# Patient Record
Sex: MALE | URBAN - METROPOLITAN AREA
[De-identification: names, ages, dates, MRNs, and addresses within clinical notes are randomized per-mention and may not be internally consistent; named-entity substitution may affect disease eponyms.]

---

## 2018-08-09 ENCOUNTER — HOSPITAL ENCOUNTER (INPATIENT)
Facility: HOSPITAL | Age: 63
LOS: 6 days | Discharge: ANOTHER HOSPITAL | DRG: 542 | End: 2018-08-15
Attending: NEUROLOGICAL SURGERY | Admitting: NEUROLOGICAL SURGERY
Payer: MEDICAID

## 2018-08-09 DIAGNOSIS — C79.51 PROSTATE CANCER METASTATIC TO BONE: ICD-10-CM

## 2018-08-09 DIAGNOSIS — Z95.5 HISTORY OF CORONARY ARTERY STENT PLACEMENT: ICD-10-CM

## 2018-08-09 DIAGNOSIS — I71.21 THORACIC ASCENDING AORTIC ANEURYSM: ICD-10-CM

## 2018-08-09 DIAGNOSIS — I25.10 CORONARY ARTERY DISEASE INVOLVING NATIVE CORONARY ARTERY OF NATIVE HEART WITHOUT ANGINA PECTORIS: ICD-10-CM

## 2018-08-09 DIAGNOSIS — Z01.818 PRE-OPERATIVE CLEARANCE: ICD-10-CM

## 2018-08-09 DIAGNOSIS — M48.9 MASS OF THORACIC VERTEBRA: ICD-10-CM

## 2018-08-09 DIAGNOSIS — C61 PROSTATE CANCER METASTATIC TO BONE: ICD-10-CM

## 2018-08-09 DIAGNOSIS — R29.898 BILATERAL LEG WEAKNESS: ICD-10-CM

## 2018-08-09 DIAGNOSIS — Z01.818 PRE-OP EVALUATION: ICD-10-CM

## 2018-08-09 DIAGNOSIS — I26.99 OTHER ACUTE PULMONARY EMBOLISM WITHOUT ACUTE COR PULMONALE: ICD-10-CM

## 2018-08-09 DIAGNOSIS — R93.89 ABNORMAL CT SCAN, CHEST: ICD-10-CM

## 2018-08-09 DIAGNOSIS — M47.14 THORACIC MYELOPATHY: ICD-10-CM

## 2018-08-09 DIAGNOSIS — N13.30 HYDROURETERONEPHROSIS: ICD-10-CM

## 2018-08-09 PROCEDURE — 85730 THROMBOPLASTIN TIME PARTIAL: CPT

## 2018-08-09 PROCEDURE — 80053 COMPREHEN METABOLIC PANEL: CPT

## 2018-08-09 PROCEDURE — 36415 COLL VENOUS BLD VENIPUNCTURE: CPT

## 2018-08-09 PROCEDURE — 86901 BLOOD TYPING SEROLOGIC RH(D): CPT

## 2018-08-09 PROCEDURE — 20600001 HC STEP DOWN PRIVATE ROOM

## 2018-08-09 PROCEDURE — 85025 COMPLETE CBC W/AUTO DIFF WBC: CPT

## 2018-08-09 PROCEDURE — 85610 PROTHROMBIN TIME: CPT

## 2018-08-09 RX ORDER — IBUPROFEN 200 MG
16 TABLET ORAL
Status: DISCONTINUED | OUTPATIENT
Start: 2018-08-09 | End: 2018-08-13

## 2018-08-09 RX ORDER — AMOXICILLIN 250 MG
1 CAPSULE ORAL DAILY
Status: DISCONTINUED | OUTPATIENT
Start: 2018-08-10 | End: 2018-08-14

## 2018-08-09 RX ORDER — AMOXICILLIN 250 MG
1 CAPSULE ORAL DAILY PRN
Status: DISCONTINUED | OUTPATIENT
Start: 2018-08-09 | End: 2018-08-09

## 2018-08-09 RX ORDER — SODIUM CHLORIDE 9 MG/ML
INJECTION, SOLUTION INTRAVENOUS CONTINUOUS
Status: DISCONTINUED | OUTPATIENT
Start: 2018-08-10 | End: 2018-08-15 | Stop reason: HOSPADM

## 2018-08-09 RX ORDER — IBUPROFEN 200 MG
24 TABLET ORAL
Status: DISCONTINUED | OUTPATIENT
Start: 2018-08-09 | End: 2018-08-13

## 2018-08-09 RX ORDER — CLOPIDOGREL BISULFATE 75 MG/1
75 TABLET ORAL DAILY
Status: ON HOLD | COMMUNITY
End: 2018-08-15 | Stop reason: HOSPADM

## 2018-08-09 RX ORDER — OXYCODONE AND ACETAMINOPHEN 10; 325 MG/1; MG/1
1 TABLET ORAL ONCE
Status: COMPLETED | OUTPATIENT
Start: 2018-08-09 | End: 2018-08-10

## 2018-08-09 RX ORDER — PANTOPRAZOLE SODIUM 40 MG/1
40 TABLET, DELAYED RELEASE ORAL DAILY
Status: DISCONTINUED | OUTPATIENT
Start: 2018-08-10 | End: 2018-08-15 | Stop reason: HOSPADM

## 2018-08-09 RX ORDER — INSULIN ASPART 100 [IU]/ML
1-10 INJECTION, SOLUTION INTRAVENOUS; SUBCUTANEOUS
Status: DISCONTINUED | OUTPATIENT
Start: 2018-08-09 | End: 2018-08-13

## 2018-08-09 RX ORDER — GLUCAGON 1 MG
1 KIT INJECTION
Status: DISCONTINUED | OUTPATIENT
Start: 2018-08-09 | End: 2018-08-13

## 2018-08-09 RX ORDER — ASPIRIN 325 MG
325 TABLET ORAL DAILY
Status: ON HOLD | COMMUNITY
End: 2018-08-15 | Stop reason: HOSPADM

## 2018-08-09 RX ORDER — METHADONE HYDROCHLORIDE 10 MG/1
10 TABLET ORAL 2 TIMES DAILY
Status: ON HOLD | COMMUNITY
End: 2018-08-15 | Stop reason: HOSPADM

## 2018-08-09 RX ORDER — ACETAMINOPHEN 325 MG/1
650 TABLET ORAL EVERY 6 HOURS PRN
Status: DISCONTINUED | OUTPATIENT
Start: 2018-08-09 | End: 2018-08-15 | Stop reason: HOSPADM

## 2018-08-09 RX ORDER — HYDRALAZINE HYDROCHLORIDE 20 MG/ML
10 INJECTION INTRAMUSCULAR; INTRAVENOUS EVERY 6 HOURS PRN
Status: DISCONTINUED | OUTPATIENT
Start: 2018-08-09 | End: 2018-08-15 | Stop reason: HOSPADM

## 2018-08-10 PROBLEM — R29.898 BILATERAL LEG WEAKNESS: Status: ACTIVE | Noted: 2018-08-10

## 2018-08-10 PROBLEM — I25.10 CAD (CORONARY ARTERY DISEASE): Status: ACTIVE | Noted: 2018-08-10

## 2018-08-10 PROBLEM — M47.14 THORACIC MYELOPATHY: Status: ACTIVE | Noted: 2018-08-10

## 2018-08-10 PROBLEM — Z01.818 PRE-OP EVALUATION: Status: ACTIVE | Noted: 2018-08-10

## 2018-08-10 PROBLEM — C79.51 PROSTATE CANCER METASTATIC TO BONE: Status: ACTIVE | Noted: 2018-08-10

## 2018-08-10 PROBLEM — C61 PROSTATE CANCER METASTATIC TO BONE: Status: ACTIVE | Noted: 2018-08-10

## 2018-08-10 PROBLEM — Z01.818 PRE-OPERATIVE CLEARANCE: Status: ACTIVE | Noted: 2018-08-10

## 2018-08-10 LAB
ABO + RH BLD: NORMAL
ALBUMIN SERPL BCP-MCNC: 3.2 G/DL
ALP SERPL-CCNC: 104 U/L
ALT SERPL W/O P-5'-P-CCNC: 7 U/L
ANION GAP SERPL CALC-SCNC: 14 MMOL/L
ANION GAP SERPL CALC-SCNC: 9 MMOL/L
APTT BLDCRRT: 28 SEC
AST SERPL-CCNC: 12 U/L
BASOPHILS # BLD AUTO: 0.02 K/UL
BASOPHILS # BLD AUTO: 0.03 K/UL
BASOPHILS NFR BLD: 0.2 %
BASOPHILS NFR BLD: 0.3 %
BILIRUB SERPL-MCNC: 0.5 MG/DL
BLD GP AB SCN CELLS X3 SERPL QL: NORMAL
BUN SERPL-MCNC: 28 MG/DL
BUN SERPL-MCNC: 29 MG/DL
CALCIUM SERPL-MCNC: 9.5 MG/DL
CALCIUM SERPL-MCNC: 9.8 MG/DL
CHLORIDE SERPL-SCNC: 100 MMOL/L
CHLORIDE SERPL-SCNC: 102 MMOL/L
CO2 SERPL-SCNC: 23 MMOL/L
CO2 SERPL-SCNC: 26 MMOL/L
COMPLEXED PSA SERPL-MCNC: 938.3 NG/ML
CREAT SERPL-MCNC: 0.9 MG/DL
CREAT SERPL-MCNC: 1 MG/DL
DIFFERENTIAL METHOD: ABNORMAL
DIFFERENTIAL METHOD: ABNORMAL
EOSINOPHIL # BLD AUTO: 0 K/UL
EOSINOPHIL # BLD AUTO: 0.1 K/UL
EOSINOPHIL NFR BLD: 0.1 %
EOSINOPHIL NFR BLD: 0.5 %
ERYTHROCYTE [DISTWIDTH] IN BLOOD BY AUTOMATED COUNT: 13.2 %
ERYTHROCYTE [DISTWIDTH] IN BLOOD BY AUTOMATED COUNT: 13.3 %
EST. GFR  (AFRICAN AMERICAN): >60 ML/MIN/1.73 M^2
EST. GFR  (AFRICAN AMERICAN): >60 ML/MIN/1.73 M^2
EST. GFR  (NON AFRICAN AMERICAN): >60 ML/MIN/1.73 M^2
EST. GFR  (NON AFRICAN AMERICAN): >60 ML/MIN/1.73 M^2
GLUCOSE SERPL-MCNC: 111 MG/DL
GLUCOSE SERPL-MCNC: 84 MG/DL
HCT VFR BLD AUTO: 36.6 %
HCT VFR BLD AUTO: 37 %
HGB BLD-MCNC: 11.9 G/DL
HGB BLD-MCNC: 12.4 G/DL
IMM GRANULOCYTES # BLD AUTO: 0.04 K/UL
IMM GRANULOCYTES # BLD AUTO: 0.04 K/UL
IMM GRANULOCYTES NFR BLD AUTO: 0.4 %
IMM GRANULOCYTES NFR BLD AUTO: 0.4 %
INR PPP: 1
LYMPHOCYTES # BLD AUTO: 1.4 K/UL
LYMPHOCYTES # BLD AUTO: 1.8 K/UL
LYMPHOCYTES NFR BLD: 13.2 %
LYMPHOCYTES NFR BLD: 18.4 %
MCH RBC QN AUTO: 29.9 PG
MCH RBC QN AUTO: 30.6 PG
MCHC RBC AUTO-ENTMCNC: 32.2 G/DL
MCHC RBC AUTO-ENTMCNC: 33.9 G/DL
MCV RBC AUTO: 90 FL
MCV RBC AUTO: 93 FL
MONOCYTES # BLD AUTO: 0.8 K/UL
MONOCYTES # BLD AUTO: 0.9 K/UL
MONOCYTES NFR BLD: 7.6 %
MONOCYTES NFR BLD: 9.4 %
NEUTROPHILS # BLD AUTO: 7.1 K/UL
NEUTROPHILS # BLD AUTO: 8.4 K/UL
NEUTROPHILS NFR BLD: 71 %
NEUTROPHILS NFR BLD: 78.5 %
NRBC BLD-RTO: 0 /100 WBC
NRBC BLD-RTO: 0 /100 WBC
PLATELET # BLD AUTO: 283 K/UL
PLATELET # BLD AUTO: 288 K/UL
PMV BLD AUTO: 10.3 FL
PMV BLD AUTO: 10.7 FL
POTASSIUM SERPL-SCNC: 4.1 MMOL/L
POTASSIUM SERPL-SCNC: 4.3 MMOL/L
PROT SERPL-MCNC: 7.3 G/DL
PROTHROMBIN TIME: 10.5 SEC
RBC # BLD AUTO: 3.98 M/UL
RBC # BLD AUTO: 4.05 M/UL
SODIUM SERPL-SCNC: 135 MMOL/L
SODIUM SERPL-SCNC: 139 MMOL/L
WBC # BLD AUTO: 10.02 K/UL
WBC # BLD AUTO: 10.69 K/UL

## 2018-08-10 PROCEDURE — 36415 COLL VENOUS BLD VENIPUNCTURE: CPT

## 2018-08-10 PROCEDURE — 85025 COMPLETE CBC W/AUTO DIFF WBC: CPT

## 2018-08-10 PROCEDURE — 99222 1ST HOSP IP/OBS MODERATE 55: CPT | Mod: ,,, | Performed by: NEUROLOGICAL SURGERY

## 2018-08-10 PROCEDURE — 93005 ELECTROCARDIOGRAM TRACING: CPT

## 2018-08-10 PROCEDURE — 25500020 PHARM REV CODE 255: Performed by: NEUROLOGICAL SURGERY

## 2018-08-10 PROCEDURE — 84153 ASSAY OF PSA TOTAL: CPT

## 2018-08-10 PROCEDURE — 20600001 HC STEP DOWN PRIVATE ROOM

## 2018-08-10 PROCEDURE — 99222 1ST HOSP IP/OBS MODERATE 55: CPT | Mod: ,,, | Performed by: INTERNAL MEDICINE

## 2018-08-10 PROCEDURE — 80048 BASIC METABOLIC PNL TOTAL CA: CPT

## 2018-08-10 PROCEDURE — 93010 ELECTROCARDIOGRAM REPORT: CPT | Mod: ,,, | Performed by: INTERNAL MEDICINE

## 2018-08-10 PROCEDURE — 25000003 PHARM REV CODE 250: Performed by: STUDENT IN AN ORGANIZED HEALTH CARE EDUCATION/TRAINING PROGRAM

## 2018-08-10 PROCEDURE — 63600175 PHARM REV CODE 636 W HCPCS: Performed by: STUDENT IN AN ORGANIZED HEALTH CARE EDUCATION/TRAINING PROGRAM

## 2018-08-10 RX ORDER — ONDANSETRON 2 MG/ML
8 INJECTION INTRAMUSCULAR; INTRAVENOUS EVERY 4 HOURS PRN
Status: DISCONTINUED | OUTPATIENT
Start: 2018-08-10 | End: 2018-08-15 | Stop reason: HOSPADM

## 2018-08-10 RX ORDER — HYDROCODONE BITARTRATE AND ACETAMINOPHEN 10; 325 MG/1; MG/1
1 TABLET ORAL EVERY 8 HOURS PRN
Status: ON HOLD | COMMUNITY
Start: 2018-07-30 | End: 2018-08-15 | Stop reason: HOSPADM

## 2018-08-10 RX ORDER — HYDROMORPHONE HYDROCHLORIDE 1 MG/ML
0.5 INJECTION, SOLUTION INTRAMUSCULAR; INTRAVENOUS; SUBCUTANEOUS
Status: DISCONTINUED | OUTPATIENT
Start: 2018-08-10 | End: 2018-08-13

## 2018-08-10 RX ORDER — GABAPENTIN 300 MG/1
300 CAPSULE ORAL 3 TIMES DAILY
Status: DISCONTINUED | OUTPATIENT
Start: 2018-08-10 | End: 2018-08-15 | Stop reason: HOSPADM

## 2018-08-10 RX ORDER — DEXAMETHASONE SODIUM PHOSPHATE 4 MG/ML
6 INJECTION, SOLUTION INTRA-ARTICULAR; INTRALESIONAL; INTRAMUSCULAR; INTRAVENOUS; SOFT TISSUE EVERY 6 HOURS
Status: DISCONTINUED | OUTPATIENT
Start: 2018-08-10 | End: 2018-08-14

## 2018-08-10 RX ORDER — DEXAMETHASONE SODIUM PHOSPHATE 4 MG/ML
4 INJECTION, SOLUTION INTRA-ARTICULAR; INTRALESIONAL; INTRAMUSCULAR; INTRAVENOUS; SOFT TISSUE EVERY 6 HOURS
Status: DISCONTINUED | OUTPATIENT
Start: 2018-08-10 | End: 2018-08-10

## 2018-08-10 RX ORDER — TAMSULOSIN HYDROCHLORIDE 0.4 MG/1
0.4 CAPSULE ORAL NIGHTLY
Status: DISCONTINUED | OUTPATIENT
Start: 2018-08-10 | End: 2018-08-15 | Stop reason: HOSPADM

## 2018-08-10 RX ORDER — OXYCODONE AND ACETAMINOPHEN 10; 325 MG/1; MG/1
1 TABLET ORAL EVERY 4 HOURS PRN
Status: DISCONTINUED | OUTPATIENT
Start: 2018-08-10 | End: 2018-08-15 | Stop reason: HOSPADM

## 2018-08-10 RX ADMIN — OXYCODONE HYDROCHLORIDE AND ACETAMINOPHEN 1 TABLET: 10; 325 TABLET ORAL at 12:08

## 2018-08-10 RX ADMIN — SODIUM CHLORIDE: 0.9 INJECTION, SOLUTION INTRAVENOUS at 06:08

## 2018-08-10 RX ADMIN — HYDROMORPHONE HYDROCHLORIDE 0.5 MG: 1 INJECTION, SOLUTION INTRAMUSCULAR; INTRAVENOUS; SUBCUTANEOUS at 04:08

## 2018-08-10 RX ADMIN — OXYCODONE HYDROCHLORIDE AND ACETAMINOPHEN 1 TABLET: 10; 325 TABLET ORAL at 05:08

## 2018-08-10 RX ADMIN — GABAPENTIN 300 MG: 300 CAPSULE ORAL at 08:08

## 2018-08-10 RX ADMIN — OXYCODONE HYDROCHLORIDE AND ACETAMINOPHEN 1 TABLET: 10; 325 TABLET ORAL at 01:08

## 2018-08-10 RX ADMIN — SODIUM CHLORIDE: 0.9 INJECTION, SOLUTION INTRAVENOUS at 12:08

## 2018-08-10 RX ADMIN — OXYCODONE HYDROCHLORIDE AND ACETAMINOPHEN 1 TABLET: 10; 325 TABLET ORAL at 09:08

## 2018-08-10 RX ADMIN — GABAPENTIN 300 MG: 300 CAPSULE ORAL at 04:08

## 2018-08-10 RX ADMIN — DEXAMETHASONE SODIUM PHOSPHATE 6 MG: 4 INJECTION, SOLUTION INTRAMUSCULAR; INTRAVENOUS at 11:08

## 2018-08-10 RX ADMIN — TAMSULOSIN HYDROCHLORIDE 0.4 MG: 0.4 CAPSULE ORAL at 08:08

## 2018-08-10 RX ADMIN — OXYCODONE HYDROCHLORIDE AND ACETAMINOPHEN 1 TABLET: 10; 325 TABLET ORAL at 10:08

## 2018-08-10 RX ADMIN — HYDROMORPHONE HYDROCHLORIDE 0.5 MG: 1 INJECTION, SOLUTION INTRAMUSCULAR; INTRAVENOUS; SUBCUTANEOUS at 11:08

## 2018-08-10 RX ADMIN — DEXAMETHASONE SODIUM PHOSPHATE 6 MG: 4 INJECTION, SOLUTION INTRAMUSCULAR; INTRAVENOUS at 05:08

## 2018-08-10 RX ADMIN — SODIUM CHLORIDE: 0.9 INJECTION, SOLUTION INTRAVENOUS at 09:08

## 2018-08-10 RX ADMIN — PANTOPRAZOLE SODIUM 40 MG: 40 TABLET, DELAYED RELEASE ORAL at 09:08

## 2018-08-10 RX ADMIN — HYDROMORPHONE HYDROCHLORIDE 0.5 MG: 1 INJECTION, SOLUTION INTRAMUSCULAR; INTRAVENOUS; SUBCUTANEOUS at 07:08

## 2018-08-10 RX ADMIN — DEXAMETHASONE SODIUM PHOSPHATE 6 MG: 4 INJECTION, SOLUTION INTRAMUSCULAR; INTRAVENOUS at 06:08

## 2018-08-10 RX ADMIN — ONDANSETRON 8 MG: 2 INJECTION INTRAMUSCULAR; INTRAVENOUS at 04:08

## 2018-08-10 RX ADMIN — IOHEXOL 100 ML: 350 INJECTION, SOLUTION INTRAVENOUS at 09:08

## 2018-08-10 RX ADMIN — GABAPENTIN 300 MG: 300 CAPSULE ORAL at 09:08

## 2018-08-10 RX ADMIN — HYDROMORPHONE HYDROCHLORIDE 0.5 MG: 1 INJECTION, SOLUTION INTRAMUSCULAR; INTRAVENOUS; SUBCUTANEOUS at 06:08

## 2018-08-10 RX ADMIN — SENNOSIDES AND DOCUSATE SODIUM 1 TABLET: 8.6; 5 TABLET ORAL at 09:08

## 2018-08-10 NOTE — CONSULTS
[facility] Medicine  Consult Note    Patient Name: Edward Barber  MRN: [id number]  Admission Date: 8/9/2018  Hospital Length of Stay: 1 days  Attending Physician: Zach Patel MD   Primary Care Provider: Primary Doctor [facility] Medicine Team: Networked reference to record PCT  Jada Queen DO      Patient information was obtained from patient and past medical records.     Consults  Subjective:     Principal Problem: <principal problem not specified>    Chief Complaint: No chief complaint on file.       HPI: Mr. 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 Complains of 8/10 mid back pain that radiates to his chest. States symptoms have improved with administration of steroids. He denies urinary incontinence or difficulty urinating. Denies bowel incontinence, complains of constipation and decreased appetite.Scheduled to have surgery 8/13.    [facility] medicine was consulted for pre op clearance.    Past Medical History:   Diagnosis Date    Arthritis     CAD (coronary artery disease) 8/10/2018    Cancer        Past Surgical History:   Procedure Laterality Date    CARDIAC SURGERY      JOINT REPLACEMENT         Review of patient's allergies indicates:  No Known Allergies    No current facility-administered medications on file prior to encounter.      No current outpatient prescriptions on file prior to encounter.     Family History     None        Social History Main Topics    Smoking status: Former Smoker     Quit date: 8/9/1998    Smokeless tobacco: Never Used    Alcohol use No    Drug use: No    Sexual activity: No     Review of Systems   Constitutional: Positive for appetite change. Negative for fatigue and fever.   HENT: Negative for  trouble swallowing.    Eyes: Negative for photophobia and visual disturbance.   Respiratory: Negative for chest tightness and shortness of breath.    Cardiovascular: Negative for chest pain and palpitations.   Gastrointestinal: Positive for constipation. Negative for abdominal pain, diarrhea, nausea and vomiting.   Genitourinary: Negative for difficulty urinating and dysuria.   Musculoskeletal: Positive for back pain.   Neurological: Positive for weakness. Negative for dizziness, speech difficulty and headaches.   Psychiatric/Behavioral: Negative for behavioral problems and confusion.     Objective:     Vital Signs (Most Recent):  Temp: 97.6 °F (36.4 °C) (08/10/18 1542)  Pulse: 61 (08/10/18 1542)  Resp: 18 (08/10/18 1542)  BP: (!) 159/80 (08/10/18 1542)  SpO2: 98 % (08/10/18 1542) Vital Signs (24h Range):  Temp:  [97.6 °F (36.4 °C)-99.1 °F (37.3 °C)] 97.6 °F (36.4 °C)  Pulse:  [61-72] 61  Resp:  [16-20] 18  SpO2:  [93 %-99 %] 98 %  BP: (137-159)/(73-88) 159/80     Weight: 87.8 kg (193 lb 9.6 oz)  Body mass index is 27 kg/m².    Physical Exam   Constitutional: He is oriented to person, place, and time.   HENT:   Head: Normocephalic and atraumatic.   Eyes: EOM are normal.   Cardiovascular: Normal rate, regular rhythm, normal heart sounds and intact distal pulses.    No murmur heard.  Pulmonary/Chest: Effort normal and breath sounds normal. No respiratory distress. He has no wheezes. He exhibits no tenderness.   Abdominal: Soft. Bowel sounds are normal. He exhibits no distension and no mass. There is no guarding.   Musculoskeletal: He exhibits no edema or tenderness.        Right hip: He exhibits decreased strength. He exhibits normal range of motion.        Left hip: He exhibits decreased strength. He exhibits normal range of motion.   Neurological: He is alert and oriented to person, place, and time. A sensory deficit is present. No cranial nerve deficit. He exhibits abnormal muscle tone (3/5 muscle strength in b/l  extremities. 5/5 in b/l ankles. Cannot actively flex b/l hips and knees).   Skin: Skin is warm and dry.   Psychiatric: His behavior is normal. Thought content normal.       Significant Labs:   BMP:   Recent Labs  Lab 08/10/18  0509   GLU 84   *   K 4.1      CO2 26   BUN 29*   CREATININE 0.9   CALCIUM 9.5     CBC:   Recent Labs  Lab 08/09/18  2332 08/10/18  0509   WBC 10.69 10.02   HGB 12.4* 11.9*   HCT 36.6* 37.0*    283       Significant Imaging: I have reviewed all pertinent imaging results/findings within the past 24 hours.    Assessment/Plan:     Pre-operative clearance      61yo male with hx of CAD s/p stent and Metastatic Prostate cancer, admitted for spinal cord compression.  Intermediate risk surgery    No hx of CVA, CHF, Diabetes or Insulin use.    RCRI-1, carries a 0.9% risk of adverse cardiac event.   EKG: Reviewed, sinus bradycardia, no st changes.  No ECHO done; patient has no clinical signs of heart failure and has no documented history of CHF.   Surgical Risk Assessment   Active cardiac issues:  Active decompensated heart failure? No   Unstable angina?  No   Significant uncontrolled arrhythmias? No   Severe valvular heart disease-Aortic or Mitral Stenosis? No   Recent MI or coronary revascularization < 30 days? No      Cardiac Risk Factors  History of CAD/ischemic heart disease? YES   History of cerebrovascular disease? No   History of compensated heart failure? No   Type 2 diabetes requiring insulin? No   Serum Creatinine > 2? No   Total cardiac risk factors 1      Assessment/Plan:   Cardiovascular Risk Assessment:  Non-emergent, intermediate risk surgery.  Active cardiac problems ( CAD S/P STENT ON DUAL ANTIPLATELET).  Functional Status:  can feed himself (1 METS)  RCRI :1 - 0.9% risk of adverse cardiac events.     Other Issues:       Anticoagulation: ASA 325mg, Plavix 75mg      Coronary Stenting: Yes     Recommendation:  1. Can Proceed to Surgery   2. Withhold ASA and Plavix for  surgery, resume post-op when appropriate  3. Start the patient on DVT prophylaxis as soon as possible after surgery.                 CAD (coronary artery disease)    CAD s/p stent placement 6yrs ago  - On ASA and Plavix at home  -Hold for surgery  - Last saw cardiologist 4 months ago for evaluation.(at [facility], doesn't recall name).          Mass of thoracic vertebra    Likely 2/2 to metastatic prostate cancer  - scheduled for decompressive surgery 8/13  - Continue with current medication regimen   dexamethasone 6q6.   gabapentin 300 tid.  - Neurosurgery following, appreciate recs.          VTE Risk Mitigation         Ordered     Place sequential compression device  Until discontinued      08/09/18 2255     IP VTE LOW RISK PATIENT  Once      08/09/18 2255              Thank you for your consult. I will follow-up with patient. Please contact us if you have any additional questions.    Jada Queen DO  Department of Hospital Medicine   [facility]

## 2018-08-10 NOTE — NURSING
POC reviewed with patient, verbalized understanding. Patient remained free from falls, harm, and pain was somewhat controlled. NAEON, neuro status stable, VSS. Bed alarm active and explained, bed in lowest position, call bell in reach. Will continue to monitor.

## 2018-08-10 NOTE — NURSING
Patient arrived to 741, no distress noted, VSS, neuro status stable. NSx notified of patient arrival. Son at bedside. Plan of care reviewed with patient, both son and patient verbalized understanding. O2 and suction at bedside, bed alarm active and explained with understanding verbalized, bed in lowest position, call bell in reach. Awaiting orders, will continue to monitor.

## 2018-08-10 NOTE — ASSESSMENT & PLAN NOTE
CAD s/p stent placement 6yrs ago  - On ASA and Plavix at home  -Hold for surgery  -Pending EKG.  - Last saw cardiologist 4 months ago for evaluation.(at [facility], doesn't recall name).

## 2018-08-10 NOTE — HPI
Mr. 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 Complains of 8/10 mid back pain that radiates to his chest. States symptoms have improved with administration of steroids. He denies urinary incontinence or difficulty urinating. Denies bowel incontinence, complains of constipation and decreased appetite.Scheduled to have surgery 8/13.    Hospital medicine was consulted for pre op clearance, possible pulmonary emboli seen on CT.

## 2018-08-10 NOTE — ASSESSMENT & PLAN NOTE
61yo male with hx of CAD s/p stent and Metastatic Prostate cancer, admitted for spinal cord compression.  Intermediate risk surgery    No hx of CVA, CHF, Diabetes or Insulin use.    RCRI-1, carries a 0.9% risk of adverse cardiac event.     No ECHO done; patient has no clinical signs of heart failure and has no documented history of CHF.   Surgical Risk Assessment   Active cardiac issues:  Active decompensated heart failure? No   Unstable angina?  No   Significant uncontrolled arrhythmias? No   Severe valvular heart disease-Aortic or Mitral Stenosis? No   Recent MI or coronary revascularization < 30 days? No      Cardiac Risk Factors  History of CAD/ischemic heart disease? YES   History of cerebrovascular disease? No   History of compensated heart failure? No   Type 2 diabetes requiring insulin? No   Serum Creatinine > 2? No   Total cardiac risk factors 1      Assessment/Plan:   Cardiovascular Risk Assessment:  Non-emergent, intermediate risk surgery.  Active cardiac problems (CAD S/P STENT ON DUAL ANTIPLATELET).  Functional Status: can feed himself (1 METS)  RCRI :1 - 0.9% risk of adverse cardiac events.     Other Issues:       Anticoagulation: ASA 325mg, Plavix 75mg      Coronary Stenting: Yes     Recommendation:  1. Can Proceed to Surgery, pending EKG   2. Withhold ASA and Plavix for surgery, resume post-op when appropriate  3. Start the patient on DVT prophylaxis as soon as possible after surgery.

## 2018-08-10 NOTE — ASSESSMENT & PLAN NOTE
Likely 2/2 to metastatic prostate cancer  - scheduled for decompressive surgery 8/13  - Continue with current medication regimen   dexamethasone 6q6.   gabapentin 300 tid.  - Neurosurgery following, appreciate recs.

## 2018-08-10 NOTE — H&P
History and Physical  Neurosurgery    Admit Date: 8/9/2018  LOS: 1    Code Status: Full Code     CC: <principal problem not specified>    SUBJECTIVE:     History of Present Illness: 61 yo male with pmh of cad s/p stenting on asa 325 qd and clopidogrel 75 qd and known prostate CA undergoing active oral chemotherapy presents as direct transfer with four day history and progressive bilateral proximal leg weakness.     Pt did not take anti-platelet regimen today.    On exam pt has paraparesis most severe in bilateral hip flexion and knee extension (4-/5). Pt also complains of radiculopathic thoracic pain in T4 and T5 dermatomes. There is a decreased but preserved sensation to light touch below T4. One beat of clonus on right. No watkins bilaterally. Normoreflexic bilaterally. Perineal sensation present and no complaints of bowel or bladder symptoms. Rectal exam deferred.    Outside constrasted neuroaxis imaging shows osteoblastic metastasis with three column involvement and epidural extension with canal obliteration at T4 and T5. Epidural extension also extends ventrally to T3. No acute fractures.    Pt also with cervical spondylytic disease with canal stenosis and signal change at C4-C5.    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            OBJECTIVE:   Vital Signs (Most Recent):   Temp: 97.9 °F (36.6 °C) (08/09/18 2339)  Pulse: 66 (08/09/18 2339)  Resp: 18 (08/09/18 2339)  BP: (!) 149/88 (08/09/18 2339)  SpO2: (!) 93 % (08/09/18 2339)    Vital Signs (24h Range):   Temp:  [97.7 °F (36.5 °C)-98.7 °F (37.1 °C)] 97.9 °F (36.6 °C)  Pulse:  [62-77] 66  Resp:  [16-18] 18  SpO2:  [93 %-99 %] 93 %  BP: (132-182)/(75-88) 149/88      I & O (Last 24h):    Intake/Output Summary (Last 24 hours) at 08/10/18 0030  Last data filed at 08/10/18 0000   Gross per 24 hour   Intake              350 ml   Output                0 ml   Net               350 ml       Physical Exam:  General: well developed, well nourished, no distress.   Head: normocephalic, atraumatic  Cervical Spine: No midline tenderness to palpation.  Thoracolumbosacral Spine: No midline tenderness to palpation.  GCS: Motor: 6/Verbal: 5/Eyes: 4 GCS Total: 15  Mental Status: Awake, Alert, Oriented x 4  Language: No aphasia  Speech: No dysarthria  Facial Droop: None   Cranial nerves: CN III-XII grossly intact.  Visual Fields: Intact.   Eyes: Pupils equal and reactive to light. Intact Conjugate horizontal and vertical pursuit. No nystagmus. No gaze deviation.   Pulmonary: No distress.  Sensory: Bilateral neuropathic pain in T4-T5 dermatomes.  Propioception: No deficit in 1st digit of toe bilaterally.  Rectal Tone: Not tested.  Drift: None.  Upper Extremity Ataxia: No Dysmetria Bilaterally  Lower Extremity Ataxia: Not tested.  Dysdiadochokinesia: Not tested.  Reflexes: 2+ patellar bilaterally.  Sargent: Absent  Clonus: One beat present on right  Babinski: Absent  Romberg: Not Tested  Pulses: Brisk and symmetric radial, DP and tibial pulses.  Motor Strength:    Strength  Shoulder Abduction Elbow Extension Elbow Flexion Wrist Extension Wrist Flexion Finger Opposition Finger Add Finger Abd   Upper: R 5/5 5/5 5/5 5/5 5/5 5/5 5/5 5/5    L 5/5 5/5 5/5 5/5 5/5 5/5 5/5 5/5     Hip Flexion Knee Extension Knee  Flexion Ankle Dflexion Ankle Pflexion EHL     Lower: R 4-/5 4/5 4-/5 5/5 5/5 5/5      L 4-/5 4/5 4-/5 5/5 5/5 5/5           Lines/Drains/Airway:          Nutrition/Tube Feeds:   Current Diet Order   Procedures    Diet NPO       Labs:  ABG: No results for input(s): PH, PO2, PCO2, HCO3, POCSATURATED, BE in the last 24 hours.  BMP:  Recent Labs  Lab 08/09/18  2332      K 4.3      CO2 23   BUN 28*   CREATININE 1.0   *     LFT: Lab Results   Component Value Date    AST 12 08/09/2018    ALT 7 (L) 08/09/2018    ALKPHOS 104 08/09/2018    BILITOT 0.5 08/09/2018    ALBUMIN 3.2 (L) 08/09/2018     PROT 7.3 08/09/2018     CBC:   Lab Results   Component Value Date    WBC 10.69 08/09/2018    HGB 12.4 (L) 08/09/2018    HCT 36.6 (L) 08/09/2018    MCV 90 08/09/2018     08/09/2018     Microbiology x 7d:   Microbiology Results (last 7 days)     ** No results found for the last 168 hours. **            ASSESSMENT/PLAN:   61 yo male with known prostate CA now with thoracic metastastic disease and canal compromise with progressive myelopathy.     --No acute neurosurgical intervention required.  --Admit to neurosurgery under floor status.  --Begin dexamethasone 6q6.  --Begin gabapentin 300 tid.  --Begin q4 neurochecks.  --Begin q4 vital checks.  --Please keep pt NPO.  --Please hold home antiplatelet regimen.  --Given symptom onset and degree of canal involvement, pt will likely require surgical decompression of neural elements in addition to XRT. Will further discuss potential management options with pt in morning.  --Discussed above with pt and pt family.  --We will continue to monitor closely, please contact us with any questions or concerns.    Carlos Jackson

## 2018-08-10 NOTE — HPI
63 yo male with pmh of cad s/p stenting on asa 325 qd and clopidogrel 75 qd and known prostate CA undergoing active oral chemotherapy presents as direct transfer with four day history of progressive bilateral proximal leg weakness. Pt did not take anti-platelet regimen today.     On exam pt has paraparesis most severe in bilateral hip flexion and knee extension (4-/5). Pt also complains of radiculopathic thoracic pain in T4 and T5 dermatomes. There is a decreased but preserved sensation to light touch below T4. One beat of clonus on right. No watkins bilaterally. Normoreflexic bilaterally. Perineal sensation present and no complaints of bowel or bladder symptoms. Rectal exam deferred.     Outside constrasted neuroaxis imaging shows osteoblastic metastasis with three column involvement and epidural extension with canal obliteration at T4 and T5. Epidural extension also extends ventrally to T3. No acute fractures. Pt also with cervical spondylytic disease with canal stenosis and signal change at C4-C5. 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

## 2018-08-10 NOTE — PLAN OF CARE
CM met with patient this am to obtain discharge planning assessment.  Patient is scheduled for surgery on Monday.  Planned discharge is home - Plan (A) or home with family - Plan (B).    PCP:  Primary Doctor No     Payor:  No insurance    Pharmacy :    [facility] - [address] [address]  Phone: [phone] Fax: [phone]         08/10/18 1202   Discharge Assessment   Assessment Type Discharge Planning Assessment   Confirmed/corrected address and phone number on facesheet? Yes   Assessment information obtained from? Patient   Expected Length of Stay (days) 7   Communicated expected length of stay with patient/caregiver yes   Prior to hospitilization cognitive status: Alert/Oriented   Prior to hospitalization functional status: Independent   Current cognitive status: Alert/Oriented   Current Functional Status: Independent   Lives With alone   Able to Return to Prior Arrangements yes   Is patient able to care for self after discharge? Yes   Who are your caregiver(s) and their phone number(s)? Olga Barber ex-wife [phone]   Patient's perception of discharge disposition home or selfcare   Readmission Within The Last 30 Days no previous admission in last 30 days   Patient currently being followed by outpatient case management? No   Patient currently receives any other outside agency services? No   Equipment Currently Used at Home none   Do you have any problems affording any of your prescribed medications? No   Is the patient taking medications as prescribed? yes   Does the patient have transportation home? Yes   Transportation Available family or friend will provide   Does the patient receive services at the [facility]? No   Discharge Plan B Home with family   Patient/Family In Agreement With Plan yes

## 2018-08-10 NOTE — SUBJECTIVE & OBJECTIVE
Past Medical History:   Diagnosis Date    Arthritis     CAD (coronary artery disease) 8/10/2018    Cancer        Past Surgical History:   Procedure Laterality Date    CARDIAC SURGERY      JOINT REPLACEMENT         Review of patient's allergies indicates:  No Known Allergies    No current facility-administered medications on file prior to encounter.      No current outpatient prescriptions on file prior to encounter.     Family History     None        Social History Main Topics    Smoking status: Former Smoker     Quit date: 8/9/1998    Smokeless tobacco: Never Used    Alcohol use No    Drug use: No    Sexual activity: No     Review of Systems   Constitutional: Positive for appetite change. Negative for fatigue and fever.   HENT: Negative for trouble swallowing.    Eyes: Negative for photophobia and visual disturbance.   Respiratory: Negative for chest tightness and shortness of breath.    Cardiovascular: Negative for chest pain and palpitations.   Gastrointestinal: Positive for constipation. Negative for abdominal pain, diarrhea, nausea and vomiting.   Genitourinary: Negative for difficulty urinating and dysuria.   Musculoskeletal: Positive for back pain.   Neurological: Positive for weakness. Negative for dizziness, speech difficulty and headaches.   Psychiatric/Behavioral: Negative for behavioral problems and confusion.     Objective:     Vital Signs (Most Recent):  Temp: 97.6 °F (36.4 °C) (08/10/18 1542)  Pulse: 61 (08/10/18 1542)  Resp: 18 (08/10/18 1542)  BP: (!) 159/80 (08/10/18 1542)  SpO2: 98 % (08/10/18 1542) Vital Signs (24h Range):  Temp:  [97.6 °F (36.4 °C)-99.1 °F (37.3 °C)] 97.6 °F (36.4 °C)  Pulse:  [61-72] 61  Resp:  [16-20] 18  SpO2:  [93 %-99 %] 98 %  BP: (137-159)/(73-88) 159/80     Weight: 87.8 kg (193 lb 9.6 oz)  Body mass index is 27 kg/m².    Physical Exam   Constitutional: He is oriented to person, place, and time.   HENT:   Head: Normocephalic and atraumatic.   Eyes: EOM are  normal.   Cardiovascular: Normal rate, regular rhythm, normal heart sounds and intact distal pulses.    No murmur heard.  Pulmonary/Chest: Effort normal and breath sounds normal. No respiratory distress. He has no wheezes. He exhibits no tenderness.   Abdominal: Soft. Bowel sounds are normal. He exhibits no distension and no mass. There is no guarding.   Musculoskeletal: He exhibits no edema or tenderness.        Right hip: He exhibits decreased strength. He exhibits normal range of motion.        Left hip: He exhibits decreased strength. He exhibits normal range of motion.   Neurological: He is alert and oriented to person, place, and time. A sensory deficit is present. No cranial nerve deficit. He exhibits abnormal muscle tone (3/5 muscle strength in b/l extremities. 5/5 in b/l ankles. Cannot actively flex b/l hips and knees).   Skin: Skin is warm and dry.   Psychiatric: His behavior is normal. Thought content normal.       Significant Labs:   BMP:   Recent Labs  Lab 08/10/18  0509   GLU 84   *   K 4.1      CO2 26   BUN 29*   CREATININE 0.9   CALCIUM 9.5     CBC:   Recent Labs  Lab 08/09/18  2332 08/10/18  0509   WBC 10.69 10.02   HGB 12.4* 11.9*   HCT 36.6* 37.0*    283       Significant Imaging: I have reviewed all pertinent imaging results/findings within the past 24 hours.

## 2018-08-11 PROBLEM — I26.99 PULMONARY EMBOLISM: Status: ACTIVE | Noted: 2018-08-11

## 2018-08-11 PROBLEM — I71.21 THORACIC ASCENDING AORTIC ANEURYSM: Status: ACTIVE | Noted: 2018-08-11

## 2018-08-11 PROBLEM — R93.89 ABNORMAL CT SCAN, CHEST: Status: ACTIVE | Noted: 2018-08-11

## 2018-08-11 PROBLEM — N13.30 HYDRONEPHROSIS: Status: ACTIVE | Noted: 2018-08-11

## 2018-08-11 LAB
ANION GAP SERPL CALC-SCNC: 11 MMOL/L
BASOPHILS # BLD AUTO: 0.02 K/UL
BASOPHILS NFR BLD: 0.2 %
BUN SERPL-MCNC: 24 MG/DL
CALCIUM SERPL-MCNC: 9.5 MG/DL
CHLORIDE SERPL-SCNC: 102 MMOL/L
CO2 SERPL-SCNC: 23 MMOL/L
CREAT SERPL-MCNC: 0.9 MG/DL
DIFFERENTIAL METHOD: ABNORMAL
EOSINOPHIL # BLD AUTO: 0 K/UL
EOSINOPHIL NFR BLD: 0.1 %
ERYTHROCYTE [DISTWIDTH] IN BLOOD BY AUTOMATED COUNT: 13.2 %
EST. GFR  (AFRICAN AMERICAN): >60 ML/MIN/1.73 M^2
EST. GFR  (NON AFRICAN AMERICAN): >60 ML/MIN/1.73 M^2
GLUCOSE SERPL-MCNC: 101 MG/DL
HCT VFR BLD AUTO: 36.8 %
HGB BLD-MCNC: 12.1 G/DL
IMM GRANULOCYTES # BLD AUTO: 0.03 K/UL
IMM GRANULOCYTES NFR BLD AUTO: 0.4 %
LYMPHOCYTES # BLD AUTO: 0.9 K/UL
LYMPHOCYTES NFR BLD: 10.5 %
MCH RBC QN AUTO: 30.2 PG
MCHC RBC AUTO-ENTMCNC: 32.9 G/DL
MCV RBC AUTO: 92 FL
MONOCYTES # BLD AUTO: 0.4 K/UL
MONOCYTES NFR BLD: 5.2 %
NEUTROPHILS # BLD AUTO: 6.9 K/UL
NEUTROPHILS NFR BLD: 83.6 %
NRBC BLD-RTO: 0 /100 WBC
PLATELET # BLD AUTO: 281 K/UL
PMV BLD AUTO: 10 FL
POTASSIUM SERPL-SCNC: 4.5 MMOL/L
RBC # BLD AUTO: 4.01 M/UL
SODIUM SERPL-SCNC: 136 MMOL/L
TESTOST SERPL-MCNC: 7 NG/DL
WBC # BLD AUTO: 8.29 K/UL

## 2018-08-11 PROCEDURE — 36415 COLL VENOUS BLD VENIPUNCTURE: CPT

## 2018-08-11 PROCEDURE — 99233 SBSQ HOSP IP/OBS HIGH 50: CPT | Mod: ,,, | Performed by: PHYSICIAN ASSISTANT

## 2018-08-11 PROCEDURE — 85025 COMPLETE CBC W/AUTO DIFF WBC: CPT

## 2018-08-11 PROCEDURE — 84403 ASSAY OF TOTAL TESTOSTERONE: CPT

## 2018-08-11 PROCEDURE — 25000003 PHARM REV CODE 250: Performed by: PHYSICIAN ASSISTANT

## 2018-08-11 PROCEDURE — 63600175 PHARM REV CODE 636 W HCPCS: Performed by: STUDENT IN AN ORGANIZED HEALTH CARE EDUCATION/TRAINING PROGRAM

## 2018-08-11 PROCEDURE — 20600001 HC STEP DOWN PRIVATE ROOM

## 2018-08-11 PROCEDURE — 99232 SBSQ HOSP IP/OBS MODERATE 35: CPT | Mod: ,,, | Performed by: INTERNAL MEDICINE

## 2018-08-11 PROCEDURE — 25000003 PHARM REV CODE 250: Performed by: STUDENT IN AN ORGANIZED HEALTH CARE EDUCATION/TRAINING PROGRAM

## 2018-08-11 PROCEDURE — 80048 BASIC METABOLIC PNL TOTAL CA: CPT

## 2018-08-11 RX ORDER — BISACODYL 10 MG
10 SUPPOSITORY, RECTAL RECTAL DAILY PRN
Status: DISCONTINUED | OUTPATIENT
Start: 2018-08-11 | End: 2018-08-15 | Stop reason: HOSPADM

## 2018-08-11 RX ORDER — HEPARIN SODIUM 5000 [USP'U]/ML
5000 INJECTION, SOLUTION INTRAVENOUS; SUBCUTANEOUS EVERY 8 HOURS
Status: DISCONTINUED | OUTPATIENT
Start: 2018-08-11 | End: 2018-08-13

## 2018-08-11 RX ORDER — BISACODYL 10 MG
10 SUPPOSITORY, RECTAL RECTAL ONCE
Status: COMPLETED | OUTPATIENT
Start: 2018-08-11 | End: 2018-08-11

## 2018-08-11 RX ADMIN — HYDROMORPHONE HYDROCHLORIDE 0.5 MG: 1 INJECTION, SOLUTION INTRAMUSCULAR; INTRAVENOUS; SUBCUTANEOUS at 08:08

## 2018-08-11 RX ADMIN — DEXAMETHASONE SODIUM PHOSPHATE 6 MG: 4 INJECTION, SOLUTION INTRAMUSCULAR; INTRAVENOUS at 11:08

## 2018-08-11 RX ADMIN — BISACODYL 10 MG: 10 SUPPOSITORY RECTAL at 12:08

## 2018-08-11 RX ADMIN — HEPARIN SODIUM 5000 UNITS: 5000 INJECTION, SOLUTION INTRAVENOUS; SUBCUTANEOUS at 02:08

## 2018-08-11 RX ADMIN — HEPARIN SODIUM 5000 UNITS: 5000 INJECTION, SOLUTION INTRAVENOUS; SUBCUTANEOUS at 08:08

## 2018-08-11 RX ADMIN — TAMSULOSIN HYDROCHLORIDE 0.4 MG: 0.4 CAPSULE ORAL at 08:08

## 2018-08-11 RX ADMIN — SODIUM CHLORIDE: 0.9 INJECTION, SOLUTION INTRAVENOUS at 06:08

## 2018-08-11 RX ADMIN — DEXAMETHASONE SODIUM PHOSPHATE 6 MG: 4 INJECTION, SOLUTION INTRAMUSCULAR; INTRAVENOUS at 05:08

## 2018-08-11 RX ADMIN — HYDROMORPHONE HYDROCHLORIDE 0.5 MG: 1 INJECTION, SOLUTION INTRAMUSCULAR; INTRAVENOUS; SUBCUTANEOUS at 12:08

## 2018-08-11 RX ADMIN — DEXAMETHASONE SODIUM PHOSPHATE 6 MG: 4 INJECTION, SOLUTION INTRAMUSCULAR; INTRAVENOUS at 12:08

## 2018-08-11 RX ADMIN — SENNOSIDES AND DOCUSATE SODIUM 1 TABLET: 8.6; 5 TABLET ORAL at 08:08

## 2018-08-11 RX ADMIN — DEXAMETHASONE SODIUM PHOSPHATE 6 MG: 4 INJECTION, SOLUTION INTRAMUSCULAR; INTRAVENOUS at 06:08

## 2018-08-11 RX ADMIN — HYDROMORPHONE HYDROCHLORIDE 0.5 MG: 1 INJECTION, SOLUTION INTRAMUSCULAR; INTRAVENOUS; SUBCUTANEOUS at 11:08

## 2018-08-11 RX ADMIN — HYDROMORPHONE HYDROCHLORIDE 0.5 MG: 1 INJECTION, SOLUTION INTRAMUSCULAR; INTRAVENOUS; SUBCUTANEOUS at 04:08

## 2018-08-11 RX ADMIN — PANTOPRAZOLE SODIUM 40 MG: 40 TABLET, DELAYED RELEASE ORAL at 08:08

## 2018-08-11 RX ADMIN — GABAPENTIN 300 MG: 300 CAPSULE ORAL at 03:08

## 2018-08-11 RX ADMIN — GABAPENTIN 300 MG: 300 CAPSULE ORAL at 08:08

## 2018-08-11 RX ADMIN — HYDROMORPHONE HYDROCHLORIDE 0.5 MG: 1 INJECTION, SOLUTION INTRAMUSCULAR; INTRAVENOUS; SUBCUTANEOUS at 06:08

## 2018-08-11 RX ADMIN — OXYCODONE HYDROCHLORIDE AND ACETAMINOPHEN 1 TABLET: 10; 325 TABLET ORAL at 03:08

## 2018-08-11 NOTE — SUBJECTIVE & OBJECTIVE
Oncology Treatment Plan:   [No treatment plan]    Current Facility-Administered Medications   Medication    0.9%  NaCl infusion    acetaminophen tablet 650 mg    bisacodyl suppository 10 mg    dexamethasone injection 6 mg    dextrose 50% injection 12.5 g    dextrose 50% injection 25 g    gabapentin capsule 300 mg    glucagon (human recombinant) injection 1 mg    glucose chewable tablet 16 g    glucose chewable tablet 16 g    glucose chewable tablet 24 g    heparin (porcine) injection 5,000 Units    hydrALAZINE injection 10 mg    HYDROmorphone injection 0.5 mg    insulin aspart U-100 pen 1-10 Units    ondansetron injection 8 mg    oxyCODONE-acetaminophen  mg per tablet 1 tablet    pantoprazole EC tablet 40 mg    senna-docusate 8.6-50 mg per tablet 1 tablet    tamsulosin 24 hr capsule 0.4 mg       Review of patient's allergies indicates:  No Known Allergies     Past Medical History:   Diagnosis Date    Chronic pain of left knee     Coronary artery disease involving native coronary artery of native heart without angina pectoris 8/10/2018    Osteoarthritis of knees, bilateral     Prostate cancer metastatic to bone 8/10/2018     Past Surgical History:   Procedure Laterality Date    CORONARY ANGIOPLASTY WITH STENT PLACEMENT  2012    JOINT REPLACEMENT      TONSILLECTOMY       Family History     None        Social History Main Topics    Smoking status: Former Smoker     Quit date: 8/9/1998    Smokeless tobacco: Never Used    Alcohol use No    Drug use: No    Sexual activity: No       Review of Systems  Objective:     Vital Signs (Most Recent):  Temp: 97.6 °F (36.4 °C) (08/11/18 1539)  Pulse: 62 (08/11/18 1539)  Resp: 18 (08/11/18 1539)  BP: (!) 147/76 (08/11/18 1539)  SpO2: (!) 94 % (08/11/18 1539) Vital Signs (24h Range):  Temp:  [97.6 °F (36.4 °C)-98.7 °F (37.1 °C)] 97.6 °F (36.4 °C)  Pulse:  [61-80] 62  Resp:  [16-20] 18  SpO2:  [94 %-97 %] 94 %  BP: (137-166)/(73-85) 147/76      Weight: 87.8 kg (193 lb 9.6 oz)  Body mass index is 27 kg/m².  Body surface area is 2.1 meters squared.    Physical Exam    Significant Labs:   All pertinent labs from the last 24 hours have been reviewed.    Diagnostic Results:  I have reviewed all pertinent imaging results/findings within the past 24 hours.

## 2018-08-11 NOTE — ASSESSMENT & PLAN NOTE
CT Chest 8/10 revealed suspected pulmonary embolism   Pt is currently high risk for DVT, wells score = 3  Lower extremity ultrasound 8/11 showed no DVT.  Plan for CTA tomorrow.

## 2018-08-11 NOTE — ASSESSMENT & PLAN NOTE
5 cm thoracic aortic aneurysm seen on CT 8/10, No signs of rupture  No prior chest imaging to compare  Pt requires follow up and monitoring outpatient

## 2018-08-11 NOTE — HPI
"61 y/o male with past medical history of CAD with previous cardiac stent in 2012 and known metastatic prostate cancer to the bone presented as transfer from [facility] for thoracic cord compression and pathologic fracture related to metastatic T4 tumor related to his prostate cancer. Patient presented with progressive bilateral lower extremity weakness and upper thoracic spine pain. Patient admitted to Neurosurgery service who plans T-spine laminectomy and posterior fusion T2-T6 on 8/13. Urology was consulted for findings of severe hydronephrosis on his right side secondary to a large pelvic mass. He says he has had intermittent right lower quadrant pain but has not had right flank pain. His creatinine is normal.     Per chart review and per the patient he was originally diagnosed with prostate cancer in 2015. At that time he was told his condition was "chronic" and was started on Xtandi and Trelstar, suggestive of metastatic disease at presentation. He now sees Dr. Sotelo in [place]. PSA is currently 938. His PSA in March was 175 and in December '17 was 73.   "

## 2018-08-11 NOTE — ASSESSMENT & PLAN NOTE
CT abdomen & pelvis shows obstructive mass in bladder causing obstruction of R. Ureter and R. hyrodoronephrosis  Consult to urology, appreciate reccs

## 2018-08-11 NOTE — CONSULTS
[facility]  Radiation Oncology  Consult Note    Patient Name: Edward Barber  MRN: [id number]  Admission Date: 8/9/2018  Hospital Length of Stay: 2 days  Code Status: Full Code   Attending Provider: Zach Patel MD  Consulting Provider: Berlin Cavazos Jr, MD  Primary Care Physician: Primary Doctor No  Principal Problem:Mass of thoracic vertebra    Inpatient consult to Radiation Oncology  Consult performed by: BERLIN CAVAZOS JR  Consult ordered by: KAVITHA RUBIO        Subjective:     HPI:  We are consulted for consideration of palliative radiotherapy.      Mr. Barber has a history of metastatic prostate cancer, currently undergoing therapy who has transferred from [facility] for evaluation of bilateral lower extremity weakness.  The patient has noted difficulty walking since Monday with associated T4 thoracic radiculomyelopathy and a newly discovered T4 metastatic tumor with cord compression. He has not ambulated since last Monday due to BLE weakness and mid back pain. His back pain is constant but made worse with movement. The patient denies any bowel or bladder dysfunction.  Work up with CT of the thoracic spine revaled Pathologic compression fracture of the T4 vertebral body with associated abnormal adjacent soft tissue attenuation which appears to involve the epidural space at the T3-T4 and T4-T5 levels noting definitive evaluation is limited due to noncontrast technique.  CT of the chest abdomen and pelvis revealed Suspected small pulmonary embolus involving the posterior segment of the right lower lobe.  There was a large mass closely approximated to the right lateral aspect of the urinary bladder with chronic obstruction of the right distal ureter and severe upstream chronic hydroureteronephrosis along with bulky retrocrural, retroperitoneal, and pelvic adenopathy, right side greater than left.  The patient is planned for CTA , bilateral lower extremity ultrasound was negative.   The patient was started on Decadron and is planned for laminectomy on Monday.      Oncology Treatment Plan:   [No treatment plan]    Current Facility-Administered Medications   Medication    0.9%  NaCl infusion    acetaminophen tablet 650 mg    bisacodyl suppository 10 mg    dexamethasone injection 6 mg    dextrose 50% injection 12.5 g    dextrose 50% injection 25 g    gabapentin capsule 300 mg    glucagon (human recombinant) injection 1 mg    glucose chewable tablet 16 g    glucose chewable tablet 16 g    glucose chewable tablet 24 g    heparin (porcine) injection 5,000 Units    hydrALAZINE injection 10 mg    HYDROmorphone injection 0.5 mg    insulin aspart U-100 pen 1-10 Units    ondansetron injection 8 mg    oxyCODONE-acetaminophen  mg per tablet 1 tablet    pantoprazole EC tablet 40 mg    senna-docusate 8.6-50 mg per tablet 1 tablet    tamsulosin 24 hr capsule 0.4 mg       Review of patient's allergies indicates:  No Known Allergies     Past Medical History:   Diagnosis Date    Chronic pain of left knee     Coronary artery disease involving native coronary artery of native heart without angina pectoris 8/10/2018    Osteoarthritis of knees, bilateral     Prostate cancer metastatic to bone 8/10/2018     Past Surgical History:   Procedure Laterality Date    CORONARY ANGIOPLASTY WITH STENT PLACEMENT  2012    JOINT REPLACEMENT      TONSILLECTOMY       Family History     None        Social History Main Topics    Smoking status: Former Smoker     Quit date: 8/9/1998    Smokeless tobacco: Never Used    Alcohol use No    Drug use: No    Sexual activity: No       Review of Systems  Objective:     Vital Signs (Most Recent):  Temp: 97.6 °F (36.4 °C) (08/11/18 1539)  Pulse: 62 (08/11/18 1539)  Resp: 18 (08/11/18 1539)  BP: (!) 147/76 (08/11/18 1539)  SpO2: (!) 94 % (08/11/18 1539) Vital Signs (24h Range):  Temp:  [97.6 °F (36.4 °C)-98.7 °F (37.1 °C)] 97.6 °F (36.4 °C)  Pulse:  [61-80]  62  Resp:  [16-20] 18  SpO2:  [94 %-97 %] 94 %  BP: (137-166)/(73-85) 147/76     Weight: 87.8 kg (193 lb 9.6 oz)  Body mass index is 27 kg/m².  Body surface area is 2.1 meters squared.    Physical Exam    Significant Labs:   All pertinent labs from the last 24 hours have been reviewed.    Diagnostic Results:  I have reviewed all pertinent imaging results/findings within the past 24 hours.    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  We will plan to see the patient is the AM  from discussion with neurosurgery, his leg weakness has improved with decadron.  Currently  he is planned for surgical decompression on Monday.  Agree with the current plan.  Await the results of CTA.   Will likely plan post operative radiotherapy which he could receive at [place].  If surgery is not possible will start radiotherapy on Monday.              Thank you for your consult. I will follow-up with patient. Please contact us if you have any additional questions.    Berlin Cavazos Jr, MD  Radiation Oncology  [facility]

## 2018-08-11 NOTE — SUBJECTIVE & OBJECTIVE
Interval History: NAEON. CT CAP done yesterday and concern for small PE involving the posteior secogment of the right lower lobe. BLE negative for dvt/ Medicine following and recommended to obtain CTA PE protocol but must wait 24 hours from previous contrast. Patient continues to have mid back pain and abdominal to BLE numbness. He states that he has improvements in BLE strength since starting steroids. He is able to stand and take small steps in room with assistance. He is also constipated x2 days and requesting suppository. Denies any CP or SOB.       Medications:  Continuous Infusions:   sodium chloride 0.9% 100 mL/hr at 08/10/18 1808     Scheduled Meds:   bisacodyl  10 mg Rectal Once    dexamethasone  6 mg Intravenous Q6H    gabapentin  300 mg Oral TID    heparin (porcine)  5,000 Units Subcutaneous Q8H    pantoprazole  40 mg Oral Daily    senna-docusate 8.6-50 mg  1 tablet Oral Daily    tamsulosin  0.4 mg Oral Nightly     PRN Meds:acetaminophen, bisacodyl, dextrose 50%, dextrose 50%, glucagon (human recombinant), glucose, glucose, glucose, hydrALAZINE, HYDROmorphone, insulin aspart U-100, ondansetron, oxyCODONE-acetaminophen     Review of Systems   Constitutional: no fever, chills or night sweats. No changes in weight   Eyes: no visual changes   ENT: no nasal congestion or sore throat   Respiratory: no cough or shortness of breath   Cardiovascular: no chest pain or palpitations   Gastrointestinal: no nausea or vomiting   Genitourinary: no hematuria or dysuria   Integument/Breast: no rash or pruritis   Hematologic/Lymphatic: no easy bruising or lymphadenopathy   Musculoskeletal: no arthralgias or myalgias. Positive for back pain, BLE weakness, and abdominal to ble numbness.   Neurological: no seizures or tremors. No weakness or paresthesia.   Behavioral/Psych: no auditory or visual hallucinations   Endocrine: no heat or cold intolerance     Objective:     Weight: 87.8 kg (193 lb 9.6 oz)  Body mass index  is 27 kg/m².  Vital Signs (Most Recent):  Temp: 98.7 °F (37.1 °C) (08/11/18 1121)  Pulse: 62 (08/11/18 1121)  Resp: 18 (08/11/18 1121)  BP: (!) 161/81 (08/11/18 1121)  SpO2: 97 % (08/11/18 1121) Vital Signs (24h Range):  Temp:  [97.6 °F (36.4 °C)-98.7 °F (37.1 °C)] 98.7 °F (37.1 °C)  Pulse:  [61-80] 62  Resp:  [16-20] 18  SpO2:  [94 %-99 %] 97 %  BP: (137-166)/(73-85) 161/81                           Neurosurgery Physical Exam   General: well developed, well nourished. no acute distress.  Neurologic: Awake, alert and oriented x3. Thought content appropriate.  Head: normocephalic, atraumatic   GCS: Motor: 6/Verbal: 5/Eyes: 4 GCS Total: 15  Cranial nerves:face symmetric and sensation intact bilaterally, tongue midline, pupils equal, round, reactive to light with accomodation, extraocular muscles intact. CN II-XII grossly intact. Shoulder shrug strength equal. Palate rises symmetrically.  Uvula midline. Hearing equal with finger rub. Gag and taste not tested.   Language: no aphasia  Speech: no dysarthria   Sensory: response to light touch throughout  Motor Strength: Moves all extremities spontaneously with good tone. Full strength upper and lower extremities. No abnormal movements seen.          Strength  Deltoids Triceps Biceps Wrist Extension Wrist Flexion Hand    Upper: R 5/5 5/5 5/5 5/5 5/5 5/5    L 5/5 5/5 5/5 5/5 5/5 5/5     Iliopsoas Quadriceps Knee  Flexion Tibialis  anterior Gastro- cnemius EHL   Lower: R 4-/5 4/5 4/5 5/5 5/5 5/5    L 3/5 4/5 4/5 5/5 5/5 5/5   Interossi muscle strength- intact    DTR: 2+ and symmetric in UE and LE   Sargent: absent  Clonus: absent  Babinski: absent   ENT: normal hearing with finger rub  Heart: RRR, no cyanosis, pallor, or edema.   Lungs:  normal respiratory effort  Abdomen: soft, slightly distended, non-tender and symmetric  Extremities: warm with no cyanosis, edema, or clubbing  Pulses: palpable distal pulses  Skin: warm, dry and intact. No visible rashes or lesions.          Significant Labs:    Recent Labs  Lab 08/09/18  2332 08/10/18  0509 08/11/18  0346   * 84 101    135* 136   K 4.3 4.1 4.5    100 102   CO2 23 26 23   BUN 28* 29* 24*   CREATININE 1.0 0.9 0.9   CALCIUM 9.8 9.5 9.5       Recent Labs  Lab 08/09/18  2332 08/10/18  0509 08/11/18  0346   WBC 10.69 10.02 8.29   HGB 12.4* 11.9* 12.1*   HCT 36.6* 37.0* 36.8*    283 281       Recent Labs  Lab 08/09/18  2332   INR 1.0   APTT 28.0     Microbiology Results (last 7 days)     ** No results found for the last 168 hours. **            Significant Diagnostics:  I have reviewed all pertinent imaging results/findings within the past 24 hours.    CT T spine 8/10/18  Pathologic compression fracture of the T4 vertebral body with associated abnormal adjacent soft tissue attenuation which appears to involve the epidural space at the T3-T4 and T4-T5 levels noting definitive evaluation is limited due to noncontrast technique.  MRI would provide more sensitive assessment of these findings.      CT CAP w/wo 8/10/18  Suspected small pulmonary embolus involving the posterior segment of the right lower lobe.  Dedicated chest CTA could be performed for confirmation if it would alter management.    Large mass closely approximated to the right lateral aspect of the urinary bladder with chronic obstruction of the right distal ureter and severe upstream chronic hydroureteronephrosis.  Findings are worrisome for malignant process.    Bulky retrocrural, retroperitoneal, and pelvic adenopathy, right side greater than left, worrisome for metastatic piotr disease.    Ascending thoracic aortic aneurysm measuring 5 cm in maximum diameter.    1.4 cm spiculated ground-glass opacity in the medial right lung apex.  Findings could represent scarring or subsegmental atelectasis versus malignancy.  Recommend attention on follow-up.    Suspected osseous metastatic lesion involving the T4 vertebral body.    Marked gaseous distension  of the colon to the level of the sigmoid colon.  Recommend continued attention on follow-up if there is concern for large bowel obstruction, and possibly correlation with endoscopy.      BLE US 8/11/18  Negative for DVT

## 2018-08-11 NOTE — HPI
We are consulted for consideration of palliative radiotherapy.      Mr. Barber has a history of metastatic prostate cancer, currently undergoing therapy who has transferred from [facility] for evaluation of bilateral lower extremity weakness.  The patient has noted difficulty walking since Monday with associated T4 thoracic radiculomyelopathy and a newly discovered T4 metastatic tumor with cord compression. He has not ambulated since last Monday due to BLE weakness and mid back pain. His back pain is constant but made worse with movement. The patient denies any bowel or bladder dysfunction.  Work up with CT of the thoracic spine revaled Pathologic compression fracture of the T4 vertebral body with associated abnormal adjacent soft tissue attenuation which appears to involve the epidural space at the T3-T4 and T4-T5 levels noting definitive evaluation is limited due to noncontrast technique.  CT of the chest abdomen and pelvis revealed Suspected small pulmonary embolus involving the posterior segment of the right lower lobe.  There was a large mass closely approximated to the right lateral aspect of the urinary bladder with chronic obstruction of the right distal ureter and severe upstream chronic hydroureteronephrosis along with bulky retrocrural, retroperitoneal, and pelvic adenopathy, right side greater than left.  The patient is planned for CTA , bilateral lower extremity ultrasound was negative.  The patient was started on Decadron and is planned for laminectomy on Monday.

## 2018-08-11 NOTE — SUBJECTIVE & OBJECTIVE
Interval History: No acute events overnight. Pt symptoms appear to be improving. CT chest showed possible concerns for PE, pt however denies leg swelling or pain.    Review of Systems   Constitutional: Negative for appetite change and fever.   Respiratory: Negative for chest tightness and shortness of breath.    Cardiovascular: Negative for chest pain, palpitations and leg swelling.   Gastrointestinal: Positive for constipation. Negative for nausea and vomiting.   Genitourinary: Negative for difficulty urinating and dysuria.   Neurological: Negative for dizziness and headaches.   Psychiatric/Behavioral: Negative for behavioral problems and confusion.     Objective:     Vital Signs (Most Recent):  Temp: 98.7 °F (37.1 °C) (08/11/18 1121)  Pulse: 62 (08/11/18 1121)  Resp: 18 (08/11/18 1121)  BP: (!) 161/81 (08/11/18 1121)  SpO2: 97 % (08/11/18 1121) Vital Signs (24h Range):  Temp:  [97.6 °F (36.4 °C)-98.7 °F (37.1 °C)] 98.7 °F (37.1 °C)  Pulse:  [61-80] 62  Resp:  [16-20] 18  SpO2:  [94 %-99 %] 97 %  BP: (137-166)/(73-85) 161/81     Weight: 87.8 kg (193 lb 9.6 oz)  Body mass index is 27 kg/m².    Intake/Output Summary (Last 24 hours) at 08/11/18 1153  Last data filed at 08/10/18 2000   Gross per 24 hour   Intake                0 ml   Output              200 ml   Net             -200 ml      Physical Exam   Constitutional: He is oriented to person, place, and time.   HENT:   Head: Normocephalic and atraumatic.   Eyes: EOM are normal.   Cardiovascular: Normal rate, regular rhythm, normal heart sounds and intact distal pulses.    No murmur heard.  Pulmonary/Chest: Effort normal and breath sounds normal. No respiratory distress. He has no wheezes. He exhibits no tenderness.   Abdominal: Soft. Bowel sounds are normal. He exhibits no distension and no mass. There is no guarding.   Musculoskeletal: He exhibits no edema or tenderness.        Right hip: He exhibits decreased strength. He exhibits normal range of motion.         Left hip: He exhibits decreased strength. He exhibits normal range of motion.   Neurological: He is alert and oriented to person, place, and time. No cranial nerve deficit or sensory deficit. He exhibits normal muscle tone (Pt able to flex b/l hips today.).   Skin: Skin is warm and dry.   Psychiatric: His behavior is normal. Thought content normal.       Significant Labs:   BMP:   Recent Labs  Lab 08/11/18  0346         K 4.5      CO2 23   BUN 24*   CREATININE 0.9   CALCIUM 9.5     CBC:   Recent Labs  Lab 08/09/18  2332 08/10/18  0509 08/11/18  0346   WBC 10.69 10.02 8.29   HGB 12.4* 11.9* 12.1*   HCT 36.6* 37.0* 36.8*    283 281       Significant Imaging: CT: I have reviewed all pertinent results/findings within the past 24 hours and my personal findings are:  1. concern for possible PE. 2. 1.4 pulmonary nodule. 3. Obstructive mass in bladder, causing R. hydronephrosis ( seen in previous CT). 4. 5 cm thoracic aorta.

## 2018-08-11 NOTE — SUBJECTIVE & OBJECTIVE
Past Medical History:   Diagnosis Date    Chronic pain of left knee     Coronary artery disease involving native coronary artery of native heart without angina pectoris 8/10/2018    Osteoarthritis of knees, bilateral     Prostate cancer metastatic to bone 8/10/2018       Past Surgical History:   Procedure Laterality Date    CORONARY ANGIOPLASTY WITH STENT PLACEMENT  2012    JOINT REPLACEMENT      TONSILLECTOMY         Review of patient's allergies indicates:  No Known Allergies    Family History     None          Social History Main Topics    Smoking status: Former Smoker     Quit date: 8/9/1998    Smokeless tobacco: Never Used    Alcohol use No    Drug use: No    Sexual activity: No       Review of Systems   Constitutional: Positive for fatigue and unexpected weight change. Negative for activity change, appetite change, chills and fever.   HENT: Negative.    Eyes: Negative.    Respiratory: Negative for chest tightness and shortness of breath.    Cardiovascular: Negative for chest pain.   Gastrointestinal: Positive for abdominal pain (RLQ) and constipation. Negative for abdominal distention, nausea and vomiting.   Genitourinary: Positive for nocturia and urgency. Negative for dysuria, penile swelling and scrotal swelling.   Musculoskeletal: Positive for back pain.   Skin: Negative.    Neurological: Positive for weakness.   Psychiatric/Behavioral: Negative.        Objective:     Temp:  [97.6 °F (36.4 °C)-98.7 °F (37.1 °C)] 98.7 °F (37.1 °C)  Pulse:  [61-80] 62  Resp:  [16-20] 18  SpO2:  [94 %-98 %] 97 %  BP: (137-166)/(73-85) 161/81     Body mass index is 27 kg/m².            Drains          No matching active lines, drains, or airways          Physical Exam   Constitutional: No distress.   HENT:   Head: Normocephalic and atraumatic.   Eyes: EOM are normal. No scleral icterus.   Cardiovascular: Normal rate and regular rhythm.    Pulmonary/Chest: Effort normal. No respiratory distress.   Abdominal: Soft.  He exhibits no distension.   No cvat bilaterally   Musculoskeletal: He exhibits no edema.   Neurological: He is alert.   Skin: Skin is warm and dry. He is not diaphoretic.     Psychiatric: He has a normal mood and affect. His behavior is normal.       Significant Labs:    BMP:    Recent Labs  Lab 08/09/18  2332 08/10/18  0509 08/11/18  0346    135* 136   K 4.3 4.1 4.5    100 102   CO2 23 26 23   BUN 28* 29* 24*   CREATININE 1.0 0.9 0.9   CALCIUM 9.8 9.5 9.5       CBC:    Recent Labs  Lab 08/09/18  2332 08/10/18  0509 08/11/18  0346   WBC 10.69 10.02 8.29   HGB 12.4* 11.9* 12.1*   HCT 36.6* 37.0* 36.8*    283 281       PSA Test:   Recent Labs  Lab 08/10/18  1823   .3*     Urine Culture: No results for input(s): LABURIN in the last 168 hours.  Urine Studies: No results for input(s): COLORU, APPEARANCEUA, PHUR, SPECGRAV, PROTEINUA, GLUCUA, KETONESU, BILIRUBINUA, OCCULTUA, NITRITE, UROBILINOGEN, LEUKOCYTESUR, RBCUA, WBCUA, BACTERIA, SQUAMEPITHEL, HYALINECASTS in the last 168 hours.    Invalid input(s): WRIGHTSUR    Significant Imaging:  CT: I have reviewed all results within the past 24 hours and my personal findings are:   Large pelvic mass that displaces the bladder to the left with compression of distal ureter and severe proximal hydroureteronephrosis. There is bulky retroperitoneal and pelvic lymphadenopathy. Mixed sclerotic and lytic lesion involving T4 with pathologic compression fracture

## 2018-08-11 NOTE — ASSESSMENT & PLAN NOTE
CAD s/p stent placement 6yrs ago  - On ASA and Plavix at home  - Hold for surgery   -Pending EKG.  - Last saw cardiologist 4 months ago for evaluation  - 2 D echo from 2017 showed normal EF and no significant valve abnormalities.

## 2018-08-11 NOTE — ASSESSMENT & PLAN NOTE
- PSA climbing quickly indicating he may be castrate resistant, will order Testosterone level to confirm.   - Would recommend heme/onc consult for prostate cancer management recommendations, especially if he is castrate resistant.  - Has normal kidney function despite ureteral compression. Do think he will potentially need drainage of his right kidney but there is thinning parenchyma suggesting limited renal function remains on that side.   - Due to large pelvic mass with displacement of bladder stent placement would be extremely challenging and potentially impossible cystoscopically. Additionally, due to progression of disease a ureteral stent would have a high likelihood of failure. Recommend non-urgent IR placement of nephrostomy tube on the right side.

## 2018-08-11 NOTE — ASSESSMENT & PLAN NOTE
62M with known metastatic prostate CA who presents with worsening thoracic radiculomyelopathy and a newly discovered T4 metastatic tumor with cord compression.     --Patient with some improvements in BLE strength since starting steroids.   --Given symptom onset and degree of canal involvement, pt will need a combination of decompressive and stabilizing thoracic spinal surgery followed by spinal XRT. tentatively booked for the OR schedule for 8/13. Thoracic mass biopsy on hold pending CTA PE protocol scheduled for tomorrow 8/12.   --CT CAP with Suspected small pulmonary embolus involving the posterior segment of the right lower lobe. Large mass closely approximated to the right lateral aspect of the urinary bladder with chronic obstruction of the right distal ureter and severe upstream chronic hydroureteronephrosis. Bulky retrocrural, retroperitoneal, and pelvic adenopathy, right side greater than left, worrisome for metastatic piotr disease. Ascending thoracic aortic aneurysm measuring 5 cm in maximum diameter. A 1.4 cm spiculated ground-glass opacity in the medial right lung apex. Suspected osseous metastatic lesion involving the T4 vertebral body.  --CT thoracic done.   --Rad onc consulted. Awaiting recommendations.   --Urology consulted given hydroureteronephrosis.  --continue dexamethasone 6q6.  --continue gabapentin 300 tid.  --q4 neurochecks.  --q4 vital checks.  --Please hold home antiplatelet regimen.  --continue daily bowel regimen. Give suppository today for constipation. Will consider enema.   --SQH for DVTP  --IS to bedside  --resume home flomax  --Medicine following, appreciate recs.      Discussed with Dr. Patel

## 2018-08-11 NOTE — PROGRESS NOTES
[facility] Hospital Medicine  Progress Note    Patient Name: Edward Barber  MRN: [id number]  Patient Class: IP- Inpatient   Admission Date: 8/9/2018  Length of Stay: 2 days  Attending Physician: Zach Patel MD  Primary Care Provider: Primary Doctor [facility] Medicine Team: Networked reference to record PCT  Jada Queen DO    Subjective:     Principal Problem:Mass of thoracic vertebra    HPI:  Mr. 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 Complains of 8/10 mid back pain that radiates to his chest. States symptoms have improved with administration of steroids. He denies urinary incontinence or difficulty urinating. Denies bowel incontinence, complains of constipation and decreased appetite.Scheduled to have surgery 8/13.    Hospital medicine was consulted for pre op clearance.    Hospital Course:  No notes on file    Interval History: No acute events overnight. Pt symptoms appear to be improving. CT chest showed possible concerns for PE, pt however denies leg swelling or pain.    Review of Systems   Constitutional: Negative for appetite change and fever.   Respiratory: Negative for chest tightness and shortness of breath.    Cardiovascular: Negative for chest pain, palpitations and leg swelling.   Gastrointestinal: Positive for constipation. Negative for nausea and vomiting.   Genitourinary: Negative for difficulty urinating and dysuria.   Neurological: Negative for dizziness and headaches.   Psychiatric/Behavioral: Negative for behavioral problems and confusion.     Objective:     Vital Signs (Most Recent):  Temp: 98.7 °F (37.1 °C) (08/11/18 1121)  Pulse: 62 (08/11/18 1121)  Resp: 18 (08/11/18 1121)  BP: (!) 161/81 (08/11/18 1121)  SpO2: 97 %  (08/11/18 1121) Vital Signs (24h Range):  Temp:  [97.6 °F (36.4 °C)-98.7 °F (37.1 °C)] 98.7 °F (37.1 °C)  Pulse:  [61-80] 62  Resp:  [16-20] 18  SpO2:  [94 %-99 %] 97 %  BP: (137-166)/(73-85) 161/81     Weight: 87.8 kg (193 lb 9.6 oz)  Body mass index is 27 kg/m².    Intake/Output Summary (Last 24 hours) at 08/11/18 1153  Last data filed at 08/10/18 2000   Gross per 24 hour   Intake                0 ml   Output              200 ml   Net             -200 ml      Physical Exam   Constitutional: He is oriented to person, place, and time.   HENT:   Head: Normocephalic and atraumatic.   Eyes: EOM are normal.   Cardiovascular: Normal rate, regular rhythm, normal heart sounds and intact distal pulses.    No murmur heard.  Pulmonary/Chest: Effort normal and breath sounds normal. No respiratory distress. He has no wheezes. He exhibits no tenderness.   Abdominal: Soft. Bowel sounds are normal. He exhibits no distension and no mass. There is no guarding.   Musculoskeletal: He exhibits no edema or tenderness.        Right hip: He exhibits decreased strength. He exhibits normal range of motion.        Left hip: He exhibits decreased strength. He exhibits normal range of motion.   Neurological: He is alert and oriented to person, place, and time. No cranial nerve deficit or sensory deficit. He exhibits normal muscle tone (Pt able to flex b/l hips today.).   Skin: Skin is warm and dry.   Psychiatric: His behavior is normal. Thought content normal.       Significant Labs:   BMP:   Recent Labs  Lab 08/11/18  0346         K 4.5      CO2 23   BUN 24*   CREATININE 0.9   CALCIUM 9.5     CBC:   Recent Labs  Lab 08/09/18  2332 08/10/18  0509 08/11/18  0346   WBC 10.69 10.02 8.29   HGB 12.4* 11.9* 12.1*   HCT 36.6* 37.0* 36.8*    283 281       Significant Imaging: CT: I have reviewed all pertinent results/findings within the past 24 hours and my personal findings are:  1. concern for possible PE. 2. 1.4 pulmonary  nodule. 3. Obstructive mass in bladder, causing R. hydronephrosis ( seen in previous CT). 4. 5 cm thoracic aorta.    Assessment/Plan:      * Mass of thoracic vertebra    Likely 2/2 to metastatic prostate cancer  - scheduled for decompressive surgery 8/13  - Continue with current medication regimen   dexamethasone 6q6.   gabapentin 300 tid.  - Neurosurgery following, appreciate recs.        Pulmonary embolism Rule out    CT Chest 8/10 revealed suspected pulmonary embolism   Pt is currently high risk for DVT, wells score = 3  Lower extremity ultrasound 8/11 showed no DVT.  Plan for CTA tomorrow.            Thoracic ascending aortic aneurysm    5 cm thoracic aortic aneurysm seen on CT 8/10, No signs of rupture  No prior chest imaging to compare  Pt requires follow up and monitoring outpatient          Hydroureteronephrosis on right     CT abdomen & pelvis shows obstructive mass in bladder causing obstruction of R. Ureter and R. hyrodoronephrosis  Consult to urology, appreciate reccs          Prostate cancer metastatic to bone    Currently on chemo and hormonal therapy  Appreciate oncology reccs            Bilateral leg weakness    Likely 2/2 metastatic tumor from existing prostate cancer  Continue with current medication regimen  Neurosurgery following  Symptoms improving        Pre-operative clearance      61yo male with hx of CAD s/p stent and Metastatic Prostate cancer, admitted for spinal cord compression.  Intermediate risk surgery    No hx of CVA, CHF, Diabetes or Insulin use.    RCRI-1, carries a 0.9% risk of adverse cardiac event.     No ECHO done; patient has no clinical signs of heart failure and has no documented history of CHF.   Surgical Risk Assessment   Active cardiac issues:  Active decompensated heart failure? No   Unstable angina?  No   Significant uncontrolled arrhythmias? No   Severe valvular heart disease-Aortic or Mitral Stenosis? No   Recent MI or coronary revascularization < 30 days? No       Cardiac Risk Factors  History of CAD/ischemic heart disease? YES   History of cerebrovascular disease? No   History of compensated heart failure? No   Type 2 diabetes requiring insulin? No   Serum Creatinine > 2? No   Total cardiac risk factors 1      Assessment/Plan:   Cardiovascular Risk Assessment:  Non-emergent, intermediate risk surgery.  Active cardiac problems (CAD S/P STENT ON DUAL ANTIPLATELET).  Functional Status: can feed himself (1 METS), prior to b/l leg weakness 4 METS  RCRI :1 - 0.9% risk of adverse cardiac events.     Other Issues:       Anticoagulation: ASA 325mg, Plavix 75mg      Coronary Stenting: Yes     Recommendation:  1. Can Proceed to Surgery, pending EKG   2. Withhold ASA and Plavix for surgery, resume post-op when appropriate  3. Start the patient on DVT prophylaxis as soon as possible after surgery.                 Coronary artery disease involving native coronary artery of native heart without angina pectoris    CAD s/p stent placement 6yrs ago  - On ASA and Plavix at home  - Hold for surgery   -Pending EKG.  - Last saw cardiologist 4 months ago for evaluation  - 2 D echo from 2017 showed normal EF and no significant valve abnormalities.           VTE Risk Mitigation         Ordered     heparin (porcine) injection 5,000 Units  Every 8 hours      08/11/18 1030     Place sequential compression device  Until discontinued      08/09/18 2255     IP VTE LOW RISK PATIENT  Once      08/09/18 2253              Jada Queen DO  Department of Hospital Medicine   [facility]

## 2018-08-11 NOTE — ASSESSMENT & PLAN NOTE
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  We will plan to see the patient is the AM  from discussion with neurosurgery, his leg weakness has improved with decadron.  Currently  he is planned for surgical decompression on Monday.  Agree with the current plan.  Await the results of CTA.   Will likely plan post operative radiotherapy which he could receive at [place] [facility].  If surgery is not possible will start radiotherapy on Monday.

## 2018-08-11 NOTE — PLAN OF CARE
Problem: Patient Care Overview  Goal: Plan of Care Review  Poc reviewed with pt. And son including medication education, pain management, measures to reduce skin breakdown, and fall precautions. Pt. And son verbalized understanding.  Pt. Free from skin breakdown and injuries this shift.  Will cont. To monitor pt.

## 2018-08-11 NOTE — ASSESSMENT & PLAN NOTE
Likely 2/2 metastatic tumor from existing prostate cancer  Continue with current medication regimen  Neurosurgery following  Symptoms improving

## 2018-08-11 NOTE — PROGRESS NOTES
[facility]  Neurosurgery  Progress Note    Subjective:     History of Present Illness: 61 yo male with pmh of cad s/p stenting on asa 325 qd and clopidogrel 75 qd and known prostate CA undergoing active oral chemotherapy presents as direct transfer with four day history of progressive bilateral proximal leg weakness. Pt did not take anti-platelet regimen today.     On exam pt has paraparesis most severe in bilateral hip flexion and knee extension (4-/5). Pt also complains of radiculopathic thoracic pain in T4 and T5 dermatomes. There is a decreased but preserved sensation to light touch below T4. One beat of clonus on right. No watkins bilaterally. Normoreflexic bilaterally. Perineal sensation present and no complaints of bowel or bladder symptoms. Rectal exam deferred.     Outside constrasted neuroaxis imaging shows osteoblastic metastasis with three column involvement and epidural extension with canal obliteration at T4 and T5. Epidural extension also extends ventrally to T3. No acute fractures. Pt also with cervical spondylytic disease with canal stenosis and signal change at C4-C5. 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           Post-Op Info:  Procedure(s) (LRB):  LAMINECTOMY, SPINE, THORACIC, WITH FUSION T2-6 laminectomy and fusion (N/A)         Interval History: NAEON. CT CAP done yesterday and concern for small PE involving the posteior secogment of the right lower lobe. BLE negative for dvt/ Medicine following and recommended to obtain CTA PE protocol but must wait 24 hours from previous contrast. Patient continues to have mid back pain and abdominal to BLE numbness. He states that he has improvements in BLE strength since starting steroids. He is able to stand and take small steps in room with assistance. He is also constipated x2 days and requesting suppository. Denies any CP or SOB.        Medications:  Continuous Infusions:   sodium chloride 0.9% 100 mL/hr at 08/10/18 1808     Scheduled Meds:   bisacodyl  10 mg Rectal Once    dexamethasone  6 mg Intravenous Q6H    gabapentin  300 mg Oral TID    heparin (porcine)  5,000 Units Subcutaneous Q8H    pantoprazole  40 mg Oral Daily    senna-docusate 8.6-50 mg  1 tablet Oral Daily    tamsulosin  0.4 mg Oral Nightly     PRN Meds:acetaminophen, bisacodyl, dextrose 50%, dextrose 50%, glucagon (human recombinant), glucose, glucose, glucose, hydrALAZINE, HYDROmorphone, insulin aspart U-100, ondansetron, oxyCODONE-acetaminophen     Review of Systems   Constitutional: no fever, chills or night sweats. No changes in weight   Eyes: no visual changes   ENT: no nasal congestion or sore throat   Respiratory: no cough or shortness of breath   Cardiovascular: no chest pain or palpitations   Gastrointestinal: no nausea or vomiting   Genitourinary: no hematuria or dysuria   Integument/Breast: no rash or pruritis   Hematologic/Lymphatic: no easy bruising or lymphadenopathy   Musculoskeletal: no arthralgias or myalgias. Positive for back pain, BLE weakness, and abdominal to ble numbness.   Neurological: no seizures or tremors. No weakness or paresthesia.   Behavioral/Psych: no auditory or visual hallucinations   Endocrine: no heat or cold intolerance     Objective:     Weight: 87.8 kg (193 lb 9.6 oz)  Body mass index is 27 kg/m².  Vital Signs (Most Recent):  Temp: 98.7 °F (37.1 °C) (08/11/18 1121)  Pulse: 62 (08/11/18 1121)  Resp: 18 (08/11/18 1121)  BP: (!) 161/81 (08/11/18 1121)  SpO2: 97 % (08/11/18 1121) Vital Signs (24h Range):  Temp:  [97.6 °F (36.4 °C)-98.7 °F (37.1 °C)] 98.7 °F (37.1 °C)  Pulse:  [61-80] 62  Resp:  [16-20] 18  SpO2:  [94 %-99 %] 97 %  BP: (137-166)/(73-85) 161/81                           Neurosurgery Physical Exam   General: well developed, well nourished. no acute distress.  Neurologic: Awake, alert and oriented x3. Thought content  appropriate.  Head: normocephalic, atraumatic   GCS: Motor: 6/Verbal: 5/Eyes: 4 GCS Total: 15  Cranial nerves:face symmetric and sensation intact bilaterally, tongue midline, pupils equal, round, reactive to light with accomodation, extraocular muscles intact. CN II-XII grossly intact. Shoulder shrug strength equal. Palate rises symmetrically.  Uvula midline. Hearing equal with finger rub. Gag and taste not tested.   Language: no aphasia  Speech: no dysarthria   Sensory: response to light touch throughout  Motor Strength: Moves all extremities spontaneously with good tone. Full strength upper and lower extremities. No abnormal movements seen.          Strength  Deltoids Triceps Biceps Wrist Extension Wrist Flexion Hand    Upper: R 5/5 5/5 5/5 5/5 5/5 5/5    L 5/5 5/5 5/5 5/5 5/5 5/5     Iliopsoas Quadriceps Knee  Flexion Tibialis  anterior Gastro- cnemius EHL   Lower: R 4-/5 4/5 4/5 5/5 5/5 5/5    L 3/5 4/5 4/5 5/5 5/5 5/5   Interossi muscle strength- intact    DTR: 2+ and symmetric in UE and LE   Sargent: absent  Clonus: absent  Babinski: absent   ENT: normal hearing with finger rub  Heart: RRR, no cyanosis, pallor, or edema.   Lungs:  normal respiratory effort  Abdomen: soft, slightly distended, non-tender and symmetric  Extremities: warm with no cyanosis, edema, or clubbing  Pulses: palpable distal pulses  Skin: warm, dry and intact. No visible rashes or lesions.         Significant Labs:    Recent Labs  Lab 08/09/18  2332 08/10/18  0509 08/11/18  0346   * 84 101    135* 136   K 4.3 4.1 4.5    100 102   CO2 23 26 23   BUN 28* 29* 24*   CREATININE 1.0 0.9 0.9   CALCIUM 9.8 9.5 9.5       Recent Labs  Lab 08/09/18  2332 08/10/18  0509 08/11/18  0346   WBC 10.69 10.02 8.29   HGB 12.4* 11.9* 12.1*   HCT 36.6* 37.0* 36.8*    283 281       Recent Labs  Lab 08/09/18  2332   INR 1.0   APTT 28.0     Microbiology Results (last 7 days)     ** No results found for the last 168 hours. **             Significant Diagnostics:  I have reviewed all pertinent imaging results/findings within the past 24 hours.    CT T spine 8/10/18  Pathologic compression fracture of the T4 vertebral body with associated abnormal adjacent soft tissue attenuation which appears to involve the epidural space at the T3-T4 and T4-T5 levels noting definitive evaluation is limited due to noncontrast technique.  MRI would provide more sensitive assessment of these findings.      CT CAP w/wo 8/10/18  Suspected small pulmonary embolus involving the posterior segment of the right lower lobe.  Dedicated chest CTA could be performed for confirmation if it would alter management.    Large mass closely approximated to the right lateral aspect of the urinary bladder with chronic obstruction of the right distal ureter and severe upstream chronic hydroureteronephrosis.  Findings are worrisome for malignant process.    Bulky retrocrural, retroperitoneal, and pelvic adenopathy, right side greater than left, worrisome for metastatic piotr disease.    Ascending thoracic aortic aneurysm measuring 5 cm in maximum diameter.    1.4 cm spiculated ground-glass opacity in the medial right lung apex.  Findings could represent scarring or subsegmental atelectasis versus malignancy.  Recommend attention on follow-up.    Suspected osseous metastatic lesion involving the T4 vertebral body.    Marked gaseous distension of the colon to the level of the sigmoid colon.  Recommend continued attention on follow-up if there is concern for large bowel obstruction, and possibly correlation with endoscopy.      BLE US 8/11/18  Negative for DVT    Assessment/Plan:     * Mass of thoracic vertebra    62M with known metastatic prostate CA who presents with worsening thoracic radiculomyelopathy and a newly discovered T4 metastatic tumor with cord compression.     --Patient with some improvements in BLE strength since starting steroids.   --Given symptom onset and degree of  canal involvement, pt will need a combination of decompressive and stabilizing thoracic spinal surgery followed by spinal XRT. tentatively booked for the OR schedule for 8/13. Thoracic mass biopsy on hold pending CTA PE protocol scheduled for tomorrow 8/12.   --CT CAP with Suspected small pulmonary embolus involving the posterior segment of the right lower lobe. Large mass closely approximated to the right lateral aspect of the urinary bladder with chronic obstruction of the right distal ureter and severe upstream chronic hydroureteronephrosis. Bulky retrocrural, retroperitoneal, and pelvic adenopathy, right side greater than left, worrisome for metastatic piotr disease. Ascending thoracic aortic aneurysm measuring 5 cm in maximum diameter. A 1.4 cm spiculated ground-glass opacity in the medial right lung apex. Suspected osseous metastatic lesion involving the T4 vertebral body.  --CT thoracic done.   --Rad onc consulted. Awaiting recommendations.   --Urology consulted given hydroureteronephrosis.  --continue dexamethasone 6q6.  --continue gabapentin 300 tid.  --q4 neurochecks.  --q4 vital checks.  --Please hold home antiplatelet regimen.  --continue daily bowel regimen. Give suppository today for constipation. Will consider enema.   --SQH for DVTP  --IS to bedside  --resume home flomax  --Medicine following, appreciate recs.      Discussed with Dr. Amanda Ibrahim PATanmayC  Neurosurgery  [facility]

## 2018-08-11 NOTE — ASSESSMENT & PLAN NOTE
63yo male with hx of CAD s/p stent and Metastatic Prostate cancer, admitted for spinal cord compression.  Intermediate risk surgery    No hx of CVA, CHF, Diabetes or Insulin use.    RCRI-1, carries a 0.9% risk of adverse cardiac event.     No ECHO done; patient has no clinical signs of heart failure and has no documented history of CHF.   Surgical Risk Assessment   Active cardiac issues:  Active decompensated heart failure? No   Unstable angina?  No   Significant uncontrolled arrhythmias? No   Severe valvular heart disease-Aortic or Mitral Stenosis? No   Recent MI or coronary revascularization < 30 days? No      Cardiac Risk Factors  History of CAD/ischemic heart disease? YES   History of cerebrovascular disease? No   History of compensated heart failure? No   Type 2 diabetes requiring insulin? No   Serum Creatinine > 2? No   Total cardiac risk factors 1      Assessment/Plan:   Cardiovascular Risk Assessment:  Non-emergent, intermediate risk surgery.  Active cardiac problems (CAD S/P STENT ON DUAL ANTIPLATELET).  Functional Status: can feed himself (1 METS), prior to b/l leg weakness 4 METS  RCRI :1 - 0.9% risk of adverse cardiac events.     Other Issues:       Anticoagulation: ASA 325mg, Plavix 75mg      Coronary Stenting: Yes     Recommendation:  1. Can Proceed to Surgery, pending EKG   2. Withhold ASA and Plavix for surgery, resume post-op when appropriate  3. Start the patient on DVT prophylaxis as soon as possible after surgery.

## 2018-08-11 NOTE — CONSULTS
See Consult note from myself and Dr. Queen dated today for initial consult note.     NEFTALI MACKENZIE MD  Attending Staff Physician   Department of [facility] Medicine, [facility] on [facility]  Pager: [phone]  Spectralink: [phone]

## 2018-08-11 NOTE — CONSULTS
"[facility]  Urology  Consult Note    Patient Name: Edward Barber  MRN: [id number]  Admission Date: 8/9/2018  Hospital Length of Stay: 2   Code Status: Full Code   Attending Provider: Zach Patel MD   Consulting Provider: Justin Aguilar MD  Primary Care Physician: Primary Doctor No  Principal Problem:Mass of thoracic vertebra    Inpatient consult to Urology  Consult performed by: JUSTIN AGUILAR  Consult ordered by: AURE COLVIN          Subjective:     HPI:  61 y/o male with past medical history of CAD with previous cardiac stent in 2012 and known metastatic prostate cancer to the bone presented as transfer from [facility] for thoracic cord compression and pathologic fracture related to metastatic T4 tumor related to his prostate cancer. Patient presented with progressive bilateral lower extremity weakness and upper thoracic spine pain. Patient admitted to Neurosurgery service who plans T-spine laminectomy and posterior fusion T2-T6 on 8/13. Urology was consulted for findings of severe hydronephrosis on his right side secondary to a large pelvic mass. He says he has had intermittent right lower quadrant pain but has not had right flank pain. His creatinine is normal.     Per chart review and per the patient he was originally diagnosed with prostate cancer in 2015. At that time he was told his condition was "chronic" and was started on Xtandi and Trelstar, suggestive of metastatic disease at presentation. He now sees Dr. Sotelo in [place]. PSA is currently 938. His PSA in March was 175 and in December '17 was 73.     Past Medical History:   Diagnosis Date    Chronic pain of left knee     Coronary artery disease involving native coronary artery of native heart without angina pectoris 8/10/2018    Osteoarthritis of knees, bilateral     Prostate cancer metastatic to bone 8/10/2018       Past Surgical History:   Procedure Laterality Date    CORONARY ANGIOPLASTY WITH STENT PLACEMENT  " 2012    JOINT REPLACEMENT      TONSILLECTOMY         Review of patient's allergies indicates:  No Known Allergies    Family History     None          Social History Main Topics    Smoking status: Former Smoker     Quit date: 8/9/1998    Smokeless tobacco: Never Used    Alcohol use No    Drug use: No    Sexual activity: No       Review of Systems   Constitutional: Positive for fatigue and unexpected weight change. Negative for activity change, appetite change, chills and fever.   HENT: Negative.    Eyes: Negative.    Respiratory: Negative for chest tightness and shortness of breath.    Cardiovascular: Negative for chest pain.   Gastrointestinal: Positive for abdominal pain (RLQ) and constipation. Negative for abdominal distention, nausea and vomiting.   Genitourinary: Positive for nocturia and urgency. Negative for dysuria, penile swelling and scrotal swelling.   Musculoskeletal: Positive for back pain.   Skin: Negative.    Neurological: Positive for weakness.   Psychiatric/Behavioral: Negative.        Objective:     Temp:  [97.6 °F (36.4 °C)-98.7 °F (37.1 °C)] 98.7 °F (37.1 °C)  Pulse:  [61-80] 62  Resp:  [16-20] 18  SpO2:  [94 %-98 %] 97 %  BP: (137-166)/(73-85) 161/81     Body mass index is 27 kg/m².            Drains          No matching active lines, drains, or airways          Physical Exam   Constitutional: No distress.   HENT:   Head: Normocephalic and atraumatic.   Eyes: EOM are normal. No scleral icterus.   Cardiovascular: Normal rate and regular rhythm.    Pulmonary/Chest: Effort normal. No respiratory distress.   Abdominal: Soft. He exhibits no distension.   No cvat bilaterally   Musculoskeletal: He exhibits no edema.   Neurological: He is alert.   Skin: Skin is warm and dry. He is not diaphoretic.     Psychiatric: He has a normal mood and affect. His behavior is normal.       Significant Labs:    BMP:    Recent Labs  Lab 08/09/18  2332 08/10/18  0509 08/11/18  0346    135* 136   K 4.3 4.1  4.5    100 102   CO2 23 26 23   BUN 28* 29* 24*   CREATININE 1.0 0.9 0.9   CALCIUM 9.8 9.5 9.5       CBC:    Recent Labs  Lab 08/09/18  2332 08/10/18  0509 08/11/18  0346   WBC 10.69 10.02 8.29   HGB 12.4* 11.9* 12.1*   HCT 36.6* 37.0* 36.8*    283 281       PSA Test:   Recent Labs  Lab 08/10/18  1823   .3*     Urine Culture: No results for input(s): LABURIN in the last 168 hours.  Urine Studies: No results for input(s): COLORU, APPEARANCEUA, PHUR, SPECGRAV, PROTEINUA, GLUCUA, KETONESU, BILIRUBINUA, OCCULTUA, NITRITE, UROBILINOGEN, LEUKOCYTESUR, RBCUA, WBCUA, BACTERIA, SQUAMEPITHEL, HYALINECASTS in the last 168 hours.    Invalid input(s): WRIGHTSUR    Significant Imaging:  CT: I have reviewed all results within the past 24 hours and my personal findings are:   Large pelvic mass that displaces the bladder to the left with compression of distal ureter and severe proximal hydroureteronephrosis. There is bulky retroperitoneal and pelvic lymphadenopathy. Mixed sclerotic and lytic lesion involving T4 with pathologic compression fracture                    Assessment and Plan:     Hydronephrosis    See prostate cancer        Prostate cancer metastatic to bone    - PSA climbing quickly indicating he may be castrate resistant, will order Testosterone level to confirm.   - Would recommend heme/onc consult for prostate cancer management recommendations, especially if he is castrate resistant.  - Has normal kidney function despite ureteral compression. Do think he will need drainage of his right kidney but there is thinning parenchyma suggesting limited renal function remains on that side.   - Due to large pelvic mass with displacement of bladder, stent placement would be extremely challenging and potentially impossible cystoscopically. Additionally, due to progression of disease, a ureteral stent would have a high likelihood of failure. Recommend non-urgent IR placement of nephrostomy tube on the right  side.            VTE Risk Mitigation         Ordered     heparin (porcine) injection 5,000 Units  Every 8 hours      08/11/18 1030     Place sequential compression device  Until discontinued      08/09/18 2255     IP VTE LOW RISK PATIENT  Once      08/09/18 2255          Thank you for your consult. I will follow-up with patient. Please contact us if you have any additional questions.    Jerzy Aguilar MD  Urology  [facility]

## 2018-08-11 NOTE — HOSPITAL COURSE
8/9: [place] [facility]   8/10: med consulted for surgical clearance. CT CAP ordered. CT T spine ordered. Rad onc consulted.   8/11: Medicine recommended BLE to r/o DVT. If negative, obtain CTA PE protocol but must wait 24 hours from previous contrast.   8/12: for CTA chest today, stood and walked with PT yesterday  8/13: Pt denies any worsening thoracic pain when standing as opposed to laying down.  8/14: Pain controlled. Received 2/10 XRT today. Right kidney non functional. No intervention per Urology. Pending return to [place] vs transfer to [facility].   8/15: Pain controlled.  Received 3/10 XRT today.  Bridging from lovenox to coumadin.  Tx back to [facility] today.

## 2018-08-12 LAB
ABO + RH BLD: NORMAL
ANION GAP SERPL CALC-SCNC: 8 MMOL/L
APTT BLDCRRT: 24.2 SEC
BASOPHILS # BLD AUTO: 0.01 K/UL
BASOPHILS NFR BLD: 0.1 %
BILIRUB UR QL STRIP: NEGATIVE
BLD GP AB SCN CELLS X3 SERPL QL: NORMAL
BUN SERPL-MCNC: 27 MG/DL
CALCIUM SERPL-MCNC: 8.9 MG/DL
CHLORIDE SERPL-SCNC: 105 MMOL/L
CLARITY UR REFRACT.AUTO: CLEAR
CO2 SERPL-SCNC: 23 MMOL/L
COLOR UR AUTO: YELLOW
CREAT SERPL-MCNC: 0.8 MG/DL
DIFFERENTIAL METHOD: ABNORMAL
EOSINOPHIL # BLD AUTO: 0 K/UL
EOSINOPHIL NFR BLD: 0 %
ERYTHROCYTE [DISTWIDTH] IN BLOOD BY AUTOMATED COUNT: 13.2 %
EST. GFR  (AFRICAN AMERICAN): >60 ML/MIN/1.73 M^2
EST. GFR  (NON AFRICAN AMERICAN): >60 ML/MIN/1.73 M^2
GLUCOSE SERPL-MCNC: 102 MG/DL
GLUCOSE UR QL STRIP: NEGATIVE
HCT VFR BLD AUTO: 34.2 %
HGB BLD-MCNC: 11.2 G/DL
HGB UR QL STRIP: NEGATIVE
IMM GRANULOCYTES # BLD AUTO: 0.03 K/UL
IMM GRANULOCYTES NFR BLD AUTO: 0.4 %
INR PPP: 1
KETONES UR QL STRIP: NEGATIVE
LEUKOCYTE ESTERASE UR QL STRIP: NEGATIVE
LYMPHOCYTES # BLD AUTO: 1.1 K/UL
LYMPHOCYTES NFR BLD: 15 %
MCH RBC QN AUTO: 29.9 PG
MCHC RBC AUTO-ENTMCNC: 32.7 G/DL
MCV RBC AUTO: 91 FL
MONOCYTES # BLD AUTO: 0.5 K/UL
MONOCYTES NFR BLD: 6.5 %
NEUTROPHILS # BLD AUTO: 5.8 K/UL
NEUTROPHILS NFR BLD: 78 %
NITRITE UR QL STRIP: NEGATIVE
NRBC BLD-RTO: 0 /100 WBC
PH UR STRIP: 5 [PH] (ref 5–8)
PLATELET # BLD AUTO: 275 K/UL
PMV BLD AUTO: 10.3 FL
POTASSIUM SERPL-SCNC: 4.4 MMOL/L
PROT UR QL STRIP: NEGATIVE
PROTHROMBIN TIME: 10.3 SEC
RBC # BLD AUTO: 3.74 M/UL
SODIUM SERPL-SCNC: 136 MMOL/L
SP GR UR STRIP: 1.02 (ref 1–1.03)
URN SPEC COLLECT METH UR: NORMAL
UROBILINOGEN UR STRIP-ACNC: NEGATIVE EU/DL
WBC # BLD AUTO: 7.42 K/UL

## 2018-08-12 PROCEDURE — 99232 SBSQ HOSP IP/OBS MODERATE 35: CPT | Mod: ,,, | Performed by: INTERNAL MEDICINE

## 2018-08-12 PROCEDURE — 85730 THROMBOPLASTIN TIME PARTIAL: CPT

## 2018-08-12 PROCEDURE — 63600175 PHARM REV CODE 636 W HCPCS: Performed by: STUDENT IN AN ORGANIZED HEALTH CARE EDUCATION/TRAINING PROGRAM

## 2018-08-12 PROCEDURE — 85025 COMPLETE CBC W/AUTO DIFF WBC: CPT

## 2018-08-12 PROCEDURE — 80048 BASIC METABOLIC PNL TOTAL CA: CPT

## 2018-08-12 PROCEDURE — 25000003 PHARM REV CODE 250: Performed by: STUDENT IN AN ORGANIZED HEALTH CARE EDUCATION/TRAINING PROGRAM

## 2018-08-12 PROCEDURE — 94761 N-INVAS EAR/PLS OXIMETRY MLT: CPT

## 2018-08-12 PROCEDURE — 86901 BLOOD TYPING SEROLOGIC RH(D): CPT

## 2018-08-12 PROCEDURE — 20600001 HC STEP DOWN PRIVATE ROOM

## 2018-08-12 PROCEDURE — 25000003 PHARM REV CODE 250: Performed by: PHYSICIAN ASSISTANT

## 2018-08-12 PROCEDURE — 85610 PROTHROMBIN TIME: CPT

## 2018-08-12 PROCEDURE — 81003 URINALYSIS AUTO W/O SCOPE: CPT

## 2018-08-12 PROCEDURE — 36415 COLL VENOUS BLD VENIPUNCTURE: CPT

## 2018-08-12 RX ADMIN — HEPARIN SODIUM 5000 UNITS: 5000 INJECTION, SOLUTION INTRAVENOUS; SUBCUTANEOUS at 05:08

## 2018-08-12 RX ADMIN — HEPARIN SODIUM 5000 UNITS: 5000 INJECTION, SOLUTION INTRAVENOUS; SUBCUTANEOUS at 03:08

## 2018-08-12 RX ADMIN — GABAPENTIN 300 MG: 300 CAPSULE ORAL at 03:08

## 2018-08-12 RX ADMIN — OXYCODONE HYDROCHLORIDE AND ACETAMINOPHEN 1 TABLET: 10; 325 TABLET ORAL at 03:08

## 2018-08-12 RX ADMIN — HYDROMORPHONE HYDROCHLORIDE 0.5 MG: 1 INJECTION, SOLUTION INTRAMUSCULAR; INTRAVENOUS; SUBCUTANEOUS at 07:08

## 2018-08-12 RX ADMIN — HYDROMORPHONE HYDROCHLORIDE 0.5 MG: 1 INJECTION, SOLUTION INTRAMUSCULAR; INTRAVENOUS; SUBCUTANEOUS at 11:08

## 2018-08-12 RX ADMIN — DEXAMETHASONE SODIUM PHOSPHATE 6 MG: 4 INJECTION, SOLUTION INTRAMUSCULAR; INTRAVENOUS at 11:08

## 2018-08-12 RX ADMIN — HEPARIN SODIUM 5000 UNITS: 5000 INJECTION, SOLUTION INTRAVENOUS; SUBCUTANEOUS at 08:08

## 2018-08-12 RX ADMIN — DEXAMETHASONE SODIUM PHOSPHATE 6 MG: 4 INJECTION, SOLUTION INTRAMUSCULAR; INTRAVENOUS at 05:08

## 2018-08-12 RX ADMIN — OXYCODONE HYDROCHLORIDE AND ACETAMINOPHEN 1 TABLET: 10; 325 TABLET ORAL at 09:08

## 2018-08-12 RX ADMIN — OXYCODONE HYDROCHLORIDE AND ACETAMINOPHEN 1 TABLET: 10; 325 TABLET ORAL at 05:08

## 2018-08-12 RX ADMIN — HYDROMORPHONE HYDROCHLORIDE 0.5 MG: 1 INJECTION, SOLUTION INTRAMUSCULAR; INTRAVENOUS; SUBCUTANEOUS at 08:08

## 2018-08-12 RX ADMIN — PANTOPRAZOLE SODIUM 40 MG: 40 TABLET, DELAYED RELEASE ORAL at 09:08

## 2018-08-12 RX ADMIN — HYDROMORPHONE HYDROCHLORIDE 0.5 MG: 1 INJECTION, SOLUTION INTRAMUSCULAR; INTRAVENOUS; SUBCUTANEOUS at 02:08

## 2018-08-12 RX ADMIN — HYDRALAZINE HYDROCHLORIDE 10 MG: 20 INJECTION INTRAMUSCULAR; INTRAVENOUS at 08:08

## 2018-08-12 RX ADMIN — SENNOSIDES AND DOCUSATE SODIUM 1 TABLET: 8.6; 5 TABLET ORAL at 09:08

## 2018-08-12 RX ADMIN — GABAPENTIN 300 MG: 300 CAPSULE ORAL at 09:08

## 2018-08-12 RX ADMIN — DEXAMETHASONE SODIUM PHOSPHATE 6 MG: 4 INJECTION, SOLUTION INTRAMUSCULAR; INTRAVENOUS at 06:08

## 2018-08-12 RX ADMIN — BISACODYL 10 MG: 10 SUPPOSITORY RECTAL at 10:08

## 2018-08-12 RX ADMIN — GABAPENTIN 300 MG: 300 CAPSULE ORAL at 08:08

## 2018-08-12 RX ADMIN — TAMSULOSIN HYDROCHLORIDE 0.4 MG: 0.4 CAPSULE ORAL at 08:08

## 2018-08-12 RX ADMIN — HYDROMORPHONE HYDROCHLORIDE 0.5 MG: 1 INJECTION, SOLUTION INTRAMUSCULAR; INTRAVENOUS; SUBCUTANEOUS at 04:08

## 2018-08-12 NOTE — SUBJECTIVE & OBJECTIVE
Interval History:   No acute events overnight  Denies flank pain  Creatinine remains normal this am    Review of Systems  Objective:     Temp:  [97.4 °F (36.3 °C)-98.7 °F (37.1 °C)] 98 °F (36.7 °C)  Pulse:  [53-65] 65  Resp:  [16-18] 18  SpO2:  [94 %-99 %] 99 %  BP: (138-163)/(74-85) 138/74     Body mass index is 27 kg/m².            Drains          None          Physical Exam   Constitutional: No distress.   HENT:   Head: Normocephalic and atraumatic.   Eyes: EOM are normal. No scleral icterus.   Cardiovascular: Normal rate and regular rhythm.    Pulmonary/Chest: Effort normal. No respiratory distress.   Abdominal: Soft. He exhibits no distension.   No cvat bilaterally   Musculoskeletal: He exhibits no edema.   Neurological: He is alert.   Skin: Skin is warm and dry. He is not diaphoretic.     Psychiatric: He has a normal mood and affect. His behavior is normal.       Significant Labs:    BMP:  Recent Labs   Lab  08/10/18   0509  08/11/18   0346  08/12/18   0451   NA  135*  136  136   K  4.1  4.5  4.4   CL  100  102  105   CO2  26  23  23   BUN  29*  24*  27*   CREATININE  0.9  0.9  0.8   CALCIUM  9.5  9.5  8.9       CBC:   Recent Labs   Lab  08/10/18   0509  08/11/18   0346  08/12/18   0451   WBC  10.02  8.29  7.42   HGB  11.9*  12.1*  11.2*   HCT  37.0*  36.8*  34.2*   PLT  283  281  275       All pertinent labs results from the past 24 hours have been reviewed.    Significant Imaging:  All pertinent imaging results/findings from the past 24 hours have been reviewed.

## 2018-08-12 NOTE — ASSESSMENT & PLAN NOTE
Likely 2/2 to metastatic prostate cancer  - scheduled for decompressive surgery 8/13  - Continue with current medication regimen   dexamethasone 6q6.   gabapentin 300 tid.  - Manage as per neurosurgery

## 2018-08-12 NOTE — PROGRESS NOTES
"[facility]  Urology  Progress Note    Patient Name: Edward Barber  MRN: [id number]  Admission Date: 8/9/2018  Hospital Length of Stay: 3 days  Code Status: Full Code   Attending Provider: Kareem De La Cruz MD  Primary Care Physician: Primary Doctor No    Subjective:     HPI:  63 y/o male with past medical history of CAD with previous cardiac stent in 2012 and known metastatic prostate cancer to the bone presented as transfer from [facility] for thoracic cord compression and pathologic fracture related to metastatic T4 tumor related to his prostate cancer. Patient presented with progressive bilateral lower extremity weakness and upper thoracic spine pain. Patient admitted to Neurosurgery service who plans T-spine laminectomy and posterior fusion T2-T6 on 8/13. Urology was consulted for findings of severe hydronephrosis on his right side secondary to a large pelvic mass. He says he has had intermittent right lower quadrant pain but has not had right flank pain. His creatinine is normal.     Per chart review and per the patient he was originally diagnosed with prostate cancer in 2015. At that time he was told his condition was "chronic" and was started on Xtandi and Trelstar, suggestive of metastatic disease at presentation. He now sees Dr. Sotelo in [place]. PSA is currently 938. His PSA in March was 175 and in December '17 was 73.     Interval History:   No acute events overnight  Denies flank pain  Creatinine remains normal this am    Review of Systems  Objective:     Temp:  [97.4 °F (36.3 °C)-98.7 °F (37.1 °C)] 98 °F (36.7 °C)  Pulse:  [53-65] 65  Resp:  [16-18] 18  SpO2:  [94 %-99 %] 99 %  BP: (138-163)/(74-85) 138/74     Body mass index is 27 kg/m².            Drains          None          Physical Exam   Constitutional: No distress.   HENT:   Head: Normocephalic and atraumatic.   Eyes: EOM are normal. No scleral icterus.   Cardiovascular: Normal rate and regular rhythm.    Pulmonary/Chest: " Effort normal. No respiratory distress.   Abdominal: Soft. He exhibits no distension.   No cvat bilaterally   Musculoskeletal: He exhibits no edema.   Neurological: He is alert.   Skin: Skin is warm and dry. He is not diaphoretic.     Psychiatric: He has a normal mood and affect. His behavior is normal.       Significant Labs:    BMP:  Recent Labs   Lab  08/10/18   0509  08/11/18 0346  08/12/18   0451   NA  135*  136  136   K  4.1  4.5  4.4   CL  100  102  105   CO2  26  23  23   BUN  29*  24*  27*   CREATININE  0.9  0.9  0.8   CALCIUM  9.5  9.5  8.9       CBC:   Recent Labs   Lab  08/10/18   0509 08/11/18 0346 08/12/18   0451   WBC  10.02  8.29  7.42   HGB  11.9*  12.1*  11.2*   HCT  37.0*  36.8*  34.2*   PLT  283  281  275       All pertinent labs results from the past 24 hours have been reviewed.    Significant Imaging:  All pertinent imaging results/findings from the past 24 hours have been reviewed.        Assessment/Plan:     Hydronephrosis    See prostate cancer        Prostate cancer metastatic to bone    - Patient has progress of disease on ADT and enzalutimide despite castrate levels of testosterone.   - Would recommend heme/onc consult for prostate cancer management recommendations, especially if he is castrate resistant.  - On review of imaging it appears that this kidney may be chronically obstructed as the parenchyma is very thin. The right kidney is likely not contributing a great deal to overall renal function and patient is currently asymptomatic   - As his kidney function is currently normal there is no need for urgent intervention at this time  - Will obtain UA to ensure there is no concern for urinary infection   - Following stabilization of spinal fracture will obtain MAG 3 scan to assess degree of right kidney function   - Due to large pelvic mass with displacement of bladder stent placement would be extremely challenging and potentially impossible cystoscopically. Additionally, due to  progression of disease a ureteral stent would have a high likelihood of failure. If intervention is required will need right nephrostomy tube placement; however, if there is limited function of right kidney no intervention will be required.            VTE Risk Mitigation (From admission, onward)        Ordered     heparin (porcine) injection 5,000 Units  Every 8 hours      08/11/18 1030     Place sequential compression device  Until discontinued      08/09/18 2255     IP VTE LOW RISK PATIENT  Once      08/09/18 2255          Cristy Jarrett MD  Urology  [facility]

## 2018-08-12 NOTE — SUBJECTIVE & OBJECTIVE
Interval History: Pt no new symptoms or concerns overnight. CTA for PE investigation pending.   Review of Systems   Constitutional: Negative for chills and fever.   HENT: Negative for postnasal drip and rhinorrhea.    Eyes: Negative for photophobia and visual disturbance.   Respiratory: Negative for shortness of breath and wheezing.    Cardiovascular: Negative for chest pain and palpitations.   Gastrointestinal: Negative for nausea and vomiting.   Neurological: Positive for weakness. Negative for seizures and headaches.   Psychiatric/Behavioral: Negative for agitation, confusion and decreased concentration.     Objective:     Vital Signs (Most Recent):  Temp: 98 °F (36.7 °C) (08/12/18 0810)  Pulse: 65 (08/12/18 0810)  Resp: 18 (08/12/18 0810)  BP: 138/74 (08/12/18 0810)  SpO2: 99 % (08/12/18 0810) Vital Signs (24h Range):  Temp:  [97.4 °F (36.3 °C)-98.7 °F (37.1 °C)] 98 °F (36.7 °C)  Pulse:  [53-65] 65  Resp:  [16-18] 18  SpO2:  [94 %-99 %] 99 %  BP: (138-163)/(74-85) 138/74     Weight: 87.8 kg (193 lb 9.6 oz)  Body mass index is 27 kg/m².    Intake/Output Summary (Last 24 hours) at 8/12/2018 0939  Last data filed at 8/12/2018 0500  Gross per 24 hour   Intake --   Output 1000 ml   Net -1000 ml      Physical Exam   Constitutional: He is oriented to person, place, and time. He appears well-developed and well-nourished. No distress.   HENT:   Head: Normocephalic and atraumatic.   Eyes: Conjunctivae are normal. No scleral icterus.   Neck: No JVD present.   Cardiovascular: Normal rate and regular rhythm.   Pulmonary/Chest: Effort normal and breath sounds normal. No stridor. No respiratory distress. He has no wheezes.   Abdominal: There is no tenderness. There is no guarding.   Musculoskeletal: He exhibits no edema or tenderness.        Right hip: He exhibits decreased strength.        Left hip: He exhibits decreased strength.   Neurological: He is alert and oriented to person, place, and time.   Skin: Skin is warm and  dry. He is not diaphoretic.       Significant Labs:   CBC:   Recent Labs   Lab  08/11/18   0346  08/12/18   0451   WBC  8.29  7.42   HGB  12.1*  11.2*   HCT  36.8*  34.2*   PLT  281  275     CMP:   Recent Labs   Lab  08/11/18   0346  08/12/18   0451   NA  136  136   K  4.5  4.4   CL  102  105   CO2  23  23   GLU  101  102   BUN  24*  27*   CREATININE  0.9  0.8   CALCIUM  9.5  8.9   ANIONGAP  11  8   EGFRNONAA  >60.0  >60.0     All pertinent labs within the past 24 hours have been reviewed.    Significant Imaging: I have reviewed all pertinent imaging results/findings within the past 24 hours.

## 2018-08-12 NOTE — ASSESSMENT & PLAN NOTE
- Patient has progress of disease on ADT and enzalutimide despite castrate levels of testosterone.   - Would recommend heme/onc consult for prostate cancer management recommendations, especially if he is castrate resistant.  - On review of imaging it appears that this kidney may be chronically obstructed as the parenchyma is very thin. The right kidney is likely not contributing a great deal to overall renal function and patient is currently asymptomatic   - As his kidney function is currently normal there is no need for urgent intervention at this time  - Will obtain UA to ensure there is no concern for urinary infection   - Following stabilization of spinal fracture will obtain MAG 3 scan to assess degree of right kidney function   - Due to large pelvic mass with displacement of bladder stent placement would be extremely challenging and potentially impossible cystoscopically. Additionally, due to progression of disease a ureteral stent would have a high likelihood of failure. If intervention is required will need right nephrostomy tube placement; however, if there is limited function of right kidney no intervention will be required.

## 2018-08-12 NOTE — ASSESSMENT & PLAN NOTE
62M with known metastatic prostate CA who presents with worsening thoracic radiculomyelopathy and a newly discovered T4 metastatic tumor with cord compression.     --Patient with some improvements in BLE strength since starting steroids.   --Given symptom onset and degree of canal involvement, pt will need a combination of decompressive and stabilizing thoracic spinal surgery followed by spinal XRT. tentatively booked for the OR schedule for 8/13. Thoracic mass biopsy on hold pending CTA PE protocol scheduled for today 8/12.   --CT CAP with Suspected small pulmonary embolus involving the posterior segment of the right lower lobe. Large mass closely approximated to the right lateral aspect of the urinary bladder with chronic obstruction of the right distal ureter and severe upstream chronic hydroureteronephrosis. Bulky retrocrural, retroperitoneal, and pelvic adenopathy, right side greater than left, worrisome for metastatic piotr disease. Ascending thoracic aortic aneurysm measuring 5 cm in maximum diameter. A 1.4 cm spiculated ground-glass opacity in the medial right lung apex. Suspected osseous metastatic lesion involving the T4 vertebral body.  --CT thoracic done.   --Rad onc consulted. Awaiting recommendations. To see patient today, likely post operative treatment at [place] unless no surgery.   --Urology consulted given hydroureteronephrosis. Recommend f/u testosterone level, nonurgent IR placement of R nephrostomy tube.   --continue dexamethasone 6q6.  --continue gabapentin 300 tid.  --q4 neurochecks.  --q4 vital checks.  --Please hold home antiplatelet regimen.  --continue daily bowel regimen. Give suppository today for constipation. Will consider enema.   --SQH for DVTP  --IS to bedside  --resume home flomax  --Medicine following, appreciate recs.    F/u CTA chest, F/u medicine clearance for OR tomorrow.

## 2018-08-12 NOTE — SUBJECTIVE & OBJECTIVE
Interval History: NAEON. Patient stood and walked with PT yesterday, very excited about progress. For CTA chest today to r/o PE, cleared by Hospital Medicine for CTA chest.     Medications:  Continuous Infusions:   sodium chloride 0.9% 100 mL/hr at 08/11/18 1821     Scheduled Meds:   dexamethasone  6 mg Intravenous Q6H    gabapentin  300 mg Oral TID    heparin (porcine)  5,000 Units Subcutaneous Q8H    pantoprazole  40 mg Oral Daily    senna-docusate 8.6-50 mg  1 tablet Oral Daily    tamsulosin  0.4 mg Oral Nightly     PRN Meds:acetaminophen, bisacodyl, dextrose 50%, dextrose 50%, glucagon (human recombinant), glucose, glucose, glucose, hydrALAZINE, HYDROmorphone, insulin aspart U-100, ondansetron, oxyCODONE-acetaminophen     Review of Systems  Objective:     Weight: 87.8 kg (193 lb 9.6 oz)  Body mass index is 27 kg/m².  Vital Signs (Most Recent):  Temp: 99 °F (37.2 °C) (08/12/18 1144)  Pulse: 75 (08/12/18 1144)  Resp: 18 (08/12/18 1144)  BP: 132/75 (08/12/18 1144)  SpO2: 97 % (08/12/18 1144) Vital Signs (24h Range):  Temp:  [97.4 °F (36.3 °C)-99 °F (37.2 °C)] 99 °F (37.2 °C)  Pulse:  [53-75] 75  Resp:  [16-18] 18  SpO2:  [94 %-99 %] 97 %  BP: (132-163)/(74-85) 132/75                    Neurosurgery Physical Exam   Awake, alert, no acute distress  [id number]  CN II-XII grossly intact  SILT      Strength   Deltoids Triceps Biceps Wrist Extension Wrist Flexion Hand    Upper: R 5/5 5/5 5/5 5/5 5/5 5/5     L 5/5 5/5 5/5 5/5 5/5 5/5       Iliopsoas Quadriceps Knee  Flexion Tibialis  anterior Gastro- cnemius EHL   Lower: R 4-/5 4/5 4/5 5/5 5/5 5/5     L 3/5 4/5 4/5 5/5 5/5 5/5   I2+ reflexes symmetric  No watkins/clonus/babainski     Significant Labs:  Recent Labs   Lab  08/11/18 0346  08/12/18   0451   GLU  101  102   NA  136  136   K  4.5  4.4   CL  102  105   CO2  23  23   BUN  24*  27*   CREATININE  0.9  0.8   CALCIUM  9.5  8.9     Recent Labs   Lab  08/11/18 0346 08/12/18   0451   WBC  8.29  7.42   HGB   12.1*  11.2*   HCT  36.8*  34.2*   PLT  281  275     No results for input(s): LABPT, INR, APTT in the last 48 hours.  Microbiology Results (last 7 days)     ** No results found for the last 168 hours. **        Recent Lab Results       08/12/18  0451 08/11/18  1348      Immature Granulocytes 0.4      Immature Grans (Abs) 0.03  Comment:  Mild elevation in immature granulocytes is non specific and   can be seen in a variety of conditions including stress response,   acute inflammation, trauma and pregnancy. Correlation with other   laboratory and clinical findings is essential.        Anion Gap 8      Baso # 0.01      Basophil% 0.1      BUN, Bld 27      Calcium 8.9      Chloride 105      CO2 23      Creatinine 0.8      Differential Method Automated      eGFR if African American >60.0      eGFR if non  >60.0  Comment:  Calculation used to obtain the estimated glomerular filtration  rate (eGFR) is the CKD-EPI equation.         Eos # 0.0      Eosinophil% 0.0      Glucose 102      Gran # (ANC) 5.8      Gran% 78.0      Hematocrit 34.2      Hemoglobin 11.2      Lymph # 1.1      Lymph% 15.0      MCH 29.9      MCHC 32.7      MCV 91      Mono # 0.5      Mono% 6.5      MPV 10.3      nRBC 0      Platelets 275      Potassium 4.4      RBC 3.74      RDW 13.2      Sodium 136      Testosterone, Total  7     WBC 7.42            Significant Diagnostics:  I have reviewed all pertinent imaging results/findings within the past 24 hours.

## 2018-08-12 NOTE — PROGRESS NOTES
[facility]  Neurosurgery  Progress Note    Subjective:     History of Present Illness: 63 yo male with pmh of cad s/p stenting on asa 325 qd and clopidogrel 75 qd and known prostate CA undergoing active oral chemotherapy presents as direct transfer with four day history of progressive bilateral proximal leg weakness. Pt did not take anti-platelet regimen today.     On exam pt has paraparesis most severe in bilateral hip flexion and knee extension (4-/5). Pt also complains of radiculopathic thoracic pain in T4 and T5 dermatomes. There is a decreased but preserved sensation to light touch below T4. One beat of clonus on right. No watkins bilaterally. Normoreflexic bilaterally. Perineal sensation present and no complaints of bowel or bladder symptoms. Rectal exam deferred.     Outside constrasted neuroaxis imaging shows osteoblastic metastasis with three column involvement and epidural extension with canal obliteration at T4 and T5. Epidural extension also extends ventrally to T3. No acute fractures. Pt also with cervical spondylytic disease with canal stenosis and signal change at C4-C5. 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           Post-Op Info:  Procedure(s) (LRB):  LAMINECTOMY, SPINE, THORACIC, WITH FUSION T2-6 laminectomy and fusion (N/A)         Interval History: NAEON. Patient stood and walked with PT yesterday, very excited about progress. For CTA chest today to r/o PE, cleared by Hospital Medicine for CTA chest.     Medications:  Continuous Infusions:   sodium chloride 0.9% 100 mL/hr at 08/11/18 1821     Scheduled Meds:   dexamethasone  6 mg Intravenous Q6H    gabapentin  300 mg Oral TID    heparin (porcine)  5,000 Units Subcutaneous Q8H    pantoprazole  40 mg Oral Daily    senna-docusate 8.6-50 mg  1 tablet Oral Daily    tamsulosin  0.4 mg Oral Nightly     PRN Meds:acetaminophen,  bisacodyl, dextrose 50%, dextrose 50%, glucagon (human recombinant), glucose, glucose, glucose, hydrALAZINE, HYDROmorphone, insulin aspart U-100, ondansetron, oxyCODONE-acetaminophen     Review of Systems  Objective:     Weight: 87.8 kg (193 lb 9.6 oz)  Body mass index is 27 kg/m².  Vital Signs (Most Recent):  Temp: 99 °F (37.2 °C) (08/12/18 1144)  Pulse: 75 (08/12/18 1144)  Resp: 18 (08/12/18 1144)  BP: 132/75 (08/12/18 1144)  SpO2: 97 % (08/12/18 1144) Vital Signs (24h Range):  Temp:  [97.4 °F (36.3 °C)-99 °F (37.2 °C)] 99 °F (37.2 °C)  Pulse:  [53-75] 75  Resp:  [16-18] 18  SpO2:  [94 %-99 %] 97 %  BP: (132-163)/(74-85) 132/75                    Neurosurgery Physical Exam   Awake, alert, no acute distress  [id number]  CN II-XII grossly intact  SILT      Strength   Deltoids Triceps Biceps Wrist Extension Wrist Flexion Hand    Upper: R 5/5 5/5 5/5 5/5 5/5 5/5     L 5/5 5/5 5/5 5/5 5/5 5/5       Iliopsoas Quadriceps Knee  Flexion Tibialis  anterior Gastro- cnemius EHL   Lower: R 4-/5 4/5 4/5 5/5 5/5 5/5     L 3/5 4/5 4/5 5/5 5/5 5/5   I2+ reflexes symmetric  No watkins/clonus/babainski     Significant Labs:  Recent Labs   Lab  08/11/18   0346  08/12/18   0451   GLU  101  102   NA  136  136   K  4.5  4.4   CL  102  105   CO2  23  23   BUN  24*  27*   CREATININE  0.9  0.8   CALCIUM  9.5  8.9     Recent Labs   Lab  08/11/18   0346  08/12/18   0451   WBC  8.29  7.42   HGB  12.1*  11.2*   HCT  36.8*  34.2*   PLT  281  275     No results for input(s): LABPT, INR, APTT in the last 48 hours.  Microbiology Results (last 7 days)     ** No results found for the last 168 hours. **        Recent Lab Results       08/12/18  0451 08/11/18  1348      Immature Granulocytes 0.4      Immature Grans (Abs) 0.03  Comment:  Mild elevation in immature granulocytes is non specific and   can be seen in a variety of conditions including stress response,   acute inflammation, trauma and pregnancy. Correlation with other   laboratory and  clinical findings is essential.        Anion Gap 8      Baso # 0.01      Basophil% 0.1      BUN, Bld 27      Calcium 8.9      Chloride 105      CO2 23      Creatinine 0.8      Differential Method Automated      eGFR if African American >60.0      eGFR if non  >60.0  Comment:  Calculation used to obtain the estimated glomerular filtration  rate (eGFR) is the CKD-EPI equation.         Eos # 0.0      Eosinophil% 0.0      Glucose 102      Gran # (ANC) 5.8      Gran% 78.0      Hematocrit 34.2      Hemoglobin 11.2      Lymph # 1.1      Lymph% 15.0      MCH 29.9      MCHC 32.7      MCV 91      Mono # 0.5      Mono% 6.5      MPV 10.3      nRBC 0      Platelets 275      Potassium 4.4      RBC 3.74      RDW 13.2      Sodium 136      Testosterone, Total  7     WBC 7.42            Significant Diagnostics:  I have reviewed all pertinent imaging results/findings within the past 24 hours.    Assessment/Plan:     * Mass of thoracic vertebra    62M with known metastatic prostate CA who presents with worsening thoracic radiculomyelopathy and a newly discovered T4 metastatic tumor with cord compression.     --Patient with some improvements in BLE strength since starting steroids.   --Given symptom onset and degree of canal involvement, pt will need a combination of decompressive and stabilizing thoracic spinal surgery followed by spinal XRT. tentatively booked for the OR schedule for 8/13. Thoracic mass biopsy on hold pending CTA PE protocol scheduled for today 8/12.   --CT CAP with Suspected small pulmonary embolus involving the posterior segment of the right lower lobe. Large mass closely approximated to the right lateral aspect of the urinary bladder with chronic obstruction of the right distal ureter and severe upstream chronic hydroureteronephrosis. Bulky retrocrural, retroperitoneal, and pelvic adenopathy, right side greater than left, worrisome for metastatic piotr disease. Ascending thoracic aortic aneurysm  measuring 5 cm in maximum diameter. A 1.4 cm spiculated ground-glass opacity in the medial right lung apex. Suspected osseous metastatic lesion involving the T4 vertebral body.  --CT thoracic done.   --Rad onc consulted. Awaiting recommendations. To see patient today, likely post operative treatment at [facility] unless no surgery.   --Urology consulted given hydroureteronephrosis. Recommend f/u testosterone level, nonurgent IR placement of R nephrostomy tube.   --continue dexamethasone 6q6.  --continue gabapentin 300 tid.  --q4 neurochecks.  --q4 vital checks.  --Please hold home antiplatelet regimen.  --continue daily bowel regimen. Give suppository today for constipation. Will consider enema.   --SQH for DVTP  --IS to bedside  --resume home flomax  --Medicine following, appreciate recs.    F/u CTA chest, F/u medicine clearance for OR tomorrow.                   Bhumika Hargrove MD  Neurosurgery  [facility]

## 2018-08-12 NOTE — ASSESSMENT & PLAN NOTE
CAD s/p stent placement 6yrs ago  - On ASA and Plavix at home  - Hold for surgery  - Last saw cardiologist 4 months ago for evaluation  - 2 D echo from 2017 showed normal EF and no significant valve abnormalities.   - No current signs or symptoms of CHF

## 2018-08-12 NOTE — ASSESSMENT & PLAN NOTE
61yo male with hx of CAD & stent and Metastatic Prostate cancer, admitted for spinal cord compression.  Intermediate risk surgery spinal decompression    No hx of CVA, CHF, Diabetes or Insulin use.    RCRI-1, carries a 0.9% risk of adverse cardiac event.     No ECHO done; patient has no clinical signs of heart failure and has no documented history of CHF.   Surgical Risk Assessment   Active cardiac issues:  Active decompensated heart failure? No   Unstable angina?  No   Significant uncontrolled arrhythmias? No   Severe valvular heart disease-Aortic or Mitral Stenosis? No   Recent MI or coronary revascularization < 30 days? No      Cardiac Risk Factors  History of CAD/ischemic heart disease? YES   History of cerebrovascular disease? No   History of compensated heart failure? No   Type 2 diabetes requiring insulin? No   Serum Creatinine > 2? No   Total cardiac risk factors 1      Assessment/Plan:   Cardiovascular Risk Assessment:  Non-emergent, intermediate risk surgery.  Active cardiac problems (CAD S/P STENT ON DUAL ANTIPLATELET).  Functional Status: can feed himself (1 METS), prior to b/l leg weakness 4 METS  RCRI :1 - 0.9% risk of adverse cardiac events.     Other Issues:       Anticoagulation: ASA 325mg, Plavix 75mg      Coronary Stenting: Yes     Recommendation:  1. Can Proceed to Surgery, pending EKG   2. Withhold ASA and Plavix for surgery, resume post-op when appropriate  3. Start the patient on DVT prophylaxis as soon as possible after surgery.

## 2018-08-12 NOTE — ASSESSMENT & PLAN NOTE
CT Chest 8/10 revealed suspected small/egmental pulmonary embolism   Pt is currently high risk for DVT, wells score = 3  Lower extremity ultrasound 8/11 showed no DVT.  Plan for CTA today

## 2018-08-12 NOTE — PROGRESS NOTES
[facility] Hospital Medicine  Progress Note    Patient Name: Edward Barber  MRN: [id number]  Patient Class: IP- Inpatient   Admission Date: 8/9/2018  Length of Stay: 3 days  Attending Physician: Zach Patel MD  Primary Care Provider: Primary Doctor [facility] Medicine Team: Networked reference to record PCT  Ashwin Christine MD    Subjective:     Principal Problem:Mass of thoracic vertebra    HPI:  Mr. 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 Complains of 8/10 mid back pain that radiates to his chest. States symptoms have improved with administration of steroids. He denies urinary incontinence or difficulty urinating. Denies bowel incontinence, complains of constipation and decreased appetite.Scheduled to have surgery 8/13.    Hospital medicine was consulted for pre op clearance.    Hospital Course:  No notes on file    Interval History: Pt no new symptoms or concerns overnight. CTA for PE investigation pending.   Review of Systems   Constitutional: Negative for chills and fever.   HENT: Negative for postnasal drip and rhinorrhea.    Eyes: Negative for photophobia and visual disturbance.   Respiratory: Negative for shortness of breath and wheezing.    Cardiovascular: Negative for chest pain and palpitations.   Gastrointestinal: Negative for nausea and vomiting.   Neurological: Positive for weakness. Negative for seizures and headaches.   Psychiatric/Behavioral: Negative for agitation, confusion and decreased concentration.     Objective:     Vital Signs (Most Recent):  Temp: 98 °F (36.7 °C) (08/12/18 0810)  Pulse: 65 (08/12/18 0810)  Resp: 18 (08/12/18 0810)  BP: 138/74 (08/12/18 0810)  SpO2: 99 % (08/12/18 0810) Vital Signs (24h Range):  Temp:  [97.4  °F (36.3 °C)-98.7 °F (37.1 °C)] 98 °F (36.7 °C)  Pulse:  [53-65] 65  Resp:  [16-18] 18  SpO2:  [94 %-99 %] 99 %  BP: (138-163)/(74-85) 138/74     Weight: 87.8 kg (193 lb 9.6 oz)  Body mass index is 27 kg/m².    Intake/Output Summary (Last 24 hours) at 8/12/2018 0939  Last data filed at 8/12/2018 0500  Gross per 24 hour   Intake --   Output 1000 ml   Net -1000 ml      Physical Exam   Constitutional: He is oriented to person, place, and time. He appears well-developed and well-nourished. No distress.   HENT:   Head: Normocephalic and atraumatic.   Eyes: Conjunctivae are normal. No scleral icterus.   Neck: No JVD present.   Cardiovascular: Normal rate and regular rhythm.   Pulmonary/Chest: Effort normal and breath sounds normal. No stridor. No respiratory distress. He has no wheezes.   Abdominal: There is no tenderness. There is no guarding.   Musculoskeletal: He exhibits no edema or tenderness.        Right hip: He exhibits decreased strength.        Left hip: He exhibits decreased strength.   Neurological: He is alert and oriented to person, place, and time.   Skin: Skin is warm and dry. He is not diaphoretic.       Significant Labs:   CBC:   Recent Labs   Lab  08/11/18   0346  08/12/18   0451   WBC  8.29  7.42   HGB  12.1*  11.2*   HCT  36.8*  34.2*   PLT  281  275     CMP:   Recent Labs   Lab  08/11/18   0346  08/12/18   0451   NA  136  136   K  4.5  4.4   CL  102  105   CO2  23  23   GLU  101  102   BUN  24*  27*   CREATININE  0.9  0.8   CALCIUM  9.5  8.9   ANIONGAP  11  8   EGFRNONAA  >60.0  >60.0     All pertinent labs within the past 24 hours have been reviewed.    Significant Imaging: I have reviewed all pertinent imaging results/findings within the past 24 hours.    Assessment/Plan:      * Mass of thoracic vertebra    Likely 2/2 to metastatic prostate cancer  - scheduled for decompressive surgery 8/13  - Continue with current medication regimen   dexamethasone 6q6.   gabapentin 300 tid.  - Manage as per  neurosurgery        Abnormal CT scan, chest    CT Chest 8/10 revealed suspected small/egmental pulmonary embolism   Pt is currently high risk for DVT, wells score = 3  Lower extremity ultrasound 8/11 showed no DVT.  Plan for CTA today            Thoracic ascending aortic aneurysm    5 cm thoracic aortic aneurysm seen on CT 8/10, No signs of rupture  No prior chest imaging to compare  Pt requires follow up and monitoring outpatient          Hydronephrosis    CT abdomen & pelvis shows obstructive mass in bladder causing obstruction of R. Ureter and R. hyrodoronephrosis  - Urology recommend non-urgent IR R nephrostomy as stenting unlikely to be successful given large bladder mass  - Manage as per urology          Bilateral leg weakness    Likely 2/2 metastatic tumor from existing prostate cancer  Continue with current medication regimen  Neurosurgery following  Symptoms improving        Prostate cancer metastatic to bone    Currently on chemo and hormonal therapy  Appreciate oncology reccs            Pre-operative clearance      61yo male with hx of CAD & stent and Metastatic Prostate cancer, admitted for spinal cord compression.  Intermediate risk surgery spinal decompression    No hx of CVA, CHF, Diabetes or Insulin use.    RCRI-1, carries a 0.9% risk of adverse cardiac event.     No ECHO done; patient has no clinical signs of heart failure and has no documented history of CHF.   Surgical Risk Assessment   Active cardiac issues:  Active decompensated heart failure? No   Unstable angina?  No   Significant uncontrolled arrhythmias? No   Severe valvular heart disease-Aortic or Mitral Stenosis? No   Recent MI or coronary revascularization < 30 days? No      Cardiac Risk Factors  History of CAD/ischemic heart disease? YES   History of cerebrovascular disease? No   History of compensated heart failure? No   Type 2 diabetes requiring insulin? No   Serum Creatinine > 2? No   Total cardiac risk factors 1      Assessment/Plan:    Cardiovascular Risk Assessment:  Non-emergent, intermediate risk surgery.  Active cardiac problems (CAD S/P STENT ON DUAL ANTIPLATELET).  Functional Status: can feed himself (1 METS), prior to b/l leg weakness 4 METS  RCRI :1 - 0.9% risk of adverse cardiac events.     Other Issues:       Anticoagulation: ASA 325mg, Plavix 75mg      Coronary Stenting: Yes     Recommendation:  1. Can Proceed to Surgery, pending EKG   2. Withhold ASA and Plavix for surgery, resume post-op when appropriate  3. Start the patient on DVT prophylaxis as soon as possible after surgery.                 Coronary artery disease involving native coronary artery of native heart without angina pectoris    CAD s/p stent placement 6yrs ago  - On ASA and Plavix at home  - Hold for surgery  - Last saw cardiologist 4 months ago for evaluation  - 2 D echo from 2017 showed normal EF and no significant valve abnormalities.   - No current signs or symptoms of CHF          VTE Risk Mitigation (From admission, onward)        Ordered     heparin (porcine) injection 5,000 Units  Every 8 hours      08/11/18 1030     Place sequential compression device  Until discontinued      08/09/18 2255     IP VTE LOW RISK PATIENT  Once      08/09/18 8899              Ashwin Christine MD  Department of Hospital Medicine   [facility]

## 2018-08-12 NOTE — ASSESSMENT & PLAN NOTE
CT abdomen & pelvis shows obstructive mass in bladder causing obstruction of R. Ureter and R. hyrodoronephrosis  - Urology recommend non-urgent IR R nephrostomy as stenting unlikely to be successful given large bladder mass  - Manage as per urology

## 2018-08-13 ENCOUNTER — DOCUMENTATION ONLY (OUTPATIENT)
Dept: RADIATION ONCOLOGY | Facility: CLINIC | Age: 63
End: 2018-08-13

## 2018-08-13 PROBLEM — Z01.818 PRE-OPERATIVE CLEARANCE: Status: RESOLVED | Noted: 2018-08-10 | Resolved: 2018-08-13

## 2018-08-13 PROBLEM — I26.99 PULMONARY EMBOLISM: Status: ACTIVE | Noted: 2018-08-13

## 2018-08-13 LAB
ANION GAP SERPL CALC-SCNC: 8 MMOL/L
BASOPHILS # BLD AUTO: 0.01 K/UL
BASOPHILS NFR BLD: 0.1 %
BUN SERPL-MCNC: 23 MG/DL
CALCIUM SERPL-MCNC: 8.7 MG/DL
CHLORIDE SERPL-SCNC: 105 MMOL/L
CO2 SERPL-SCNC: 22 MMOL/L
CREAT SERPL-MCNC: 0.9 MG/DL
DIFFERENTIAL METHOD: ABNORMAL
EOSINOPHIL # BLD AUTO: 0 K/UL
EOSINOPHIL NFR BLD: 0.1 %
ERYTHROCYTE [DISTWIDTH] IN BLOOD BY AUTOMATED COUNT: 13.2 %
EST. GFR  (AFRICAN AMERICAN): >60 ML/MIN/1.73 M^2
EST. GFR  (NON AFRICAN AMERICAN): >60 ML/MIN/1.73 M^2
GLUCOSE SERPL-MCNC: 94 MG/DL
HCT VFR BLD AUTO: 36.4 %
HGB BLD-MCNC: 12.2 G/DL
IMM GRANULOCYTES # BLD AUTO: 0.06 K/UL
IMM GRANULOCYTES NFR BLD AUTO: 0.7 %
LYMPHOCYTES # BLD AUTO: 1.1 K/UL
LYMPHOCYTES NFR BLD: 12.2 %
MCH RBC QN AUTO: 30.3 PG
MCHC RBC AUTO-ENTMCNC: 33.5 G/DL
MCV RBC AUTO: 91 FL
MONOCYTES # BLD AUTO: 0.5 K/UL
MONOCYTES NFR BLD: 5.2 %
NEUTROPHILS # BLD AUTO: 7.4 K/UL
NEUTROPHILS NFR BLD: 81.7 %
NRBC BLD-RTO: 0 /100 WBC
PLATELET # BLD AUTO: 282 K/UL
PMV BLD AUTO: 11 FL
POTASSIUM SERPL-SCNC: 3.9 MMOL/L
RBC # BLD AUTO: 4.02 M/UL
SODIUM SERPL-SCNC: 135 MMOL/L
WBC # BLD AUTO: 9.04 K/UL

## 2018-08-13 PROCEDURE — 77334 RADIATION TREATMENT AID(S): CPT | Mod: TC | Performed by: RADIOLOGY

## 2018-08-13 PROCEDURE — 25000003 PHARM REV CODE 250: Performed by: STUDENT IN AN ORGANIZED HEALTH CARE EDUCATION/TRAINING PROGRAM

## 2018-08-13 PROCEDURE — 25500020 PHARM REV CODE 255: Performed by: NEUROLOGICAL SURGERY

## 2018-08-13 PROCEDURE — 77295 3-D RADIOTHERAPY PLAN: CPT | Mod: 26,,, | Performed by: RADIOLOGY

## 2018-08-13 PROCEDURE — 77334 RADIATION TREATMENT AID(S): CPT | Mod: 26,,, | Performed by: RADIOLOGY

## 2018-08-13 PROCEDURE — 77295 3-D RADIOTHERAPY PLAN: CPT | Mod: TC | Performed by: RADIOLOGY

## 2018-08-13 PROCEDURE — 77300 RADIATION THERAPY DOSE PLAN: CPT | Mod: 26,,, | Performed by: RADIOLOGY

## 2018-08-13 PROCEDURE — 63600175 PHARM REV CODE 636 W HCPCS: Performed by: STUDENT IN AN ORGANIZED HEALTH CARE EDUCATION/TRAINING PROGRAM

## 2018-08-13 PROCEDURE — 80048 BASIC METABOLIC PNL TOTAL CA: CPT

## 2018-08-13 PROCEDURE — 77014 HC CT GUIDANCE RADIATION THERAPY FLDS PLACEMENT: CPT | Mod: TC | Performed by: RADIOLOGY

## 2018-08-13 PROCEDURE — 77300 RADIATION THERAPY DOSE PLAN: CPT | Mod: TC | Performed by: RADIOLOGY

## 2018-08-13 PROCEDURE — 77387 GUIDANCE FOR RADJ TX DLVR: CPT | Mod: ,,, | Performed by: RADIOLOGY

## 2018-08-13 PROCEDURE — 99232 SBSQ HOSP IP/OBS MODERATE 35: CPT | Mod: ,,, | Performed by: INTERNAL MEDICINE

## 2018-08-13 PROCEDURE — 77417 THER RADIOLOGY PORT IMAGE(S): CPT | Performed by: RADIOLOGY

## 2018-08-13 PROCEDURE — 77412 RADIATION TX DELIVERY LVL 3: CPT | Performed by: RADIOLOGY

## 2018-08-13 PROCEDURE — 85025 COMPLETE CBC W/AUTO DIFF WBC: CPT

## 2018-08-13 PROCEDURE — 20600001 HC STEP DOWN PRIVATE ROOM

## 2018-08-13 PROCEDURE — 77263 THER RADIOLOGY TX PLNG CPLX: CPT | Mod: ,,, | Performed by: RADIOLOGY

## 2018-08-13 PROCEDURE — 77290 THER RAD SIMULAJ FIELD CPLX: CPT | Mod: TC | Performed by: RADIOLOGY

## 2018-08-13 PROCEDURE — 94761 N-INVAS EAR/PLS OXIMETRY MLT: CPT

## 2018-08-13 PROCEDURE — 36415 COLL VENOUS BLD VENIPUNCTURE: CPT

## 2018-08-13 PROCEDURE — 77387 GUIDANCE FOR RADJ TX DLVR: CPT | Mod: TC | Performed by: RADIOLOGY

## 2018-08-13 RX ORDER — SIMETHICONE 80 MG
1 TABLET,CHEWABLE ORAL 3 TIMES DAILY PRN
Status: DISCONTINUED | OUTPATIENT
Start: 2018-08-13 | End: 2018-08-15 | Stop reason: HOSPADM

## 2018-08-13 RX ORDER — ENOXAPARIN SODIUM 100 MG/ML
90 INJECTION SUBCUTANEOUS EVERY 12 HOURS
Status: DISCONTINUED | OUTPATIENT
Start: 2018-08-13 | End: 2018-08-15 | Stop reason: HOSPADM

## 2018-08-13 RX ORDER — CEFAZOLIN SODIUM 1 G/3ML
2 INJECTION, POWDER, FOR SOLUTION INTRAMUSCULAR; INTRAVENOUS
Status: DISCONTINUED | OUTPATIENT
Start: 2018-08-13 | End: 2018-08-15 | Stop reason: HOSPADM

## 2018-08-13 RX ORDER — WARFARIN SODIUM 5 MG/1
5 TABLET ORAL ONCE
Status: COMPLETED | OUTPATIENT
Start: 2018-08-13 | End: 2018-08-13

## 2018-08-13 RX ORDER — HYDROMORPHONE HYDROCHLORIDE 1 MG/ML
0.5 INJECTION, SOLUTION INTRAMUSCULAR; INTRAVENOUS; SUBCUTANEOUS
Status: DISCONTINUED | OUTPATIENT
Start: 2018-08-13 | End: 2018-08-15 | Stop reason: HOSPADM

## 2018-08-13 RX ADMIN — WARFARIN SODIUM 5 MG: 5 TABLET ORAL at 06:08

## 2018-08-13 RX ADMIN — OXYCODONE HYDROCHLORIDE AND ACETAMINOPHEN 1 TABLET: 10; 325 TABLET ORAL at 04:08

## 2018-08-13 RX ADMIN — GABAPENTIN 300 MG: 300 CAPSULE ORAL at 08:08

## 2018-08-13 RX ADMIN — HYDROMORPHONE HYDROCHLORIDE 0.5 MG: 1 INJECTION, SOLUTION INTRAMUSCULAR; INTRAVENOUS; SUBCUTANEOUS at 10:08

## 2018-08-13 RX ADMIN — SENNOSIDES AND DOCUSATE SODIUM 1 TABLET: 8.6; 5 TABLET ORAL at 08:08

## 2018-08-13 RX ADMIN — DEXAMETHASONE SODIUM PHOSPHATE 6 MG: 4 INJECTION, SOLUTION INTRAMUSCULAR; INTRAVENOUS at 11:08

## 2018-08-13 RX ADMIN — DEXAMETHASONE SODIUM PHOSPHATE 6 MG: 4 INJECTION, SOLUTION INTRAMUSCULAR; INTRAVENOUS at 05:08

## 2018-08-13 RX ADMIN — GABAPENTIN 300 MG: 300 CAPSULE ORAL at 04:08

## 2018-08-13 RX ADMIN — Medication 0.5 MG: at 11:08

## 2018-08-13 RX ADMIN — SIMETHICONE CHEW TAB 80 MG 80 MG: 80 TABLET ORAL at 10:08

## 2018-08-13 RX ADMIN — HYDROMORPHONE HYDROCHLORIDE 0.5 MG: 1 INJECTION, SOLUTION INTRAMUSCULAR; INTRAVENOUS; SUBCUTANEOUS at 05:08

## 2018-08-13 RX ADMIN — Medication 0.5 MG: at 05:08

## 2018-08-13 RX ADMIN — HYDROMORPHONE HYDROCHLORIDE 0.5 MG: 1 INJECTION, SOLUTION INTRAMUSCULAR; INTRAVENOUS; SUBCUTANEOUS at 03:08

## 2018-08-13 RX ADMIN — ENOXAPARIN SODIUM 90 MG: 100 INJECTION SUBCUTANEOUS at 08:08

## 2018-08-13 RX ADMIN — Medication 0.5 MG: at 08:08

## 2018-08-13 RX ADMIN — PANTOPRAZOLE SODIUM 40 MG: 40 TABLET, DELAYED RELEASE ORAL at 08:08

## 2018-08-13 RX ADMIN — TAMSULOSIN HYDROCHLORIDE 0.4 MG: 0.4 CAPSULE ORAL at 08:08

## 2018-08-13 RX ADMIN — IOHEXOL 100 ML: 350 INJECTION, SOLUTION INTRAVENOUS at 12:08

## 2018-08-13 RX ADMIN — HYDROMORPHONE HYDROCHLORIDE 0.5 MG: 1 INJECTION, SOLUTION INTRAMUSCULAR; INTRAVENOUS; SUBCUTANEOUS at 08:08

## 2018-08-13 NOTE — SUBJECTIVE & OBJECTIVE
Interval History: CTA significant for b/l segmental Pes of lower lobes and ascending aorta aneurysm. No overnight events. Neurosurgery postponed surgery. Patient scheduled to receive 1st radiation therapy treatment today. Will also receive MAG 3 scan for hydroureter/hydronephrosis secondary to prostate enlargement.    Review of Systems   Constitutional: Negative for chills and fever.   HENT: Negative for postnasal drip and rhinorrhea.    Eyes: Negative for photophobia and visual disturbance.   Respiratory: Negative for shortness of breath and wheezing.    Cardiovascular: Negative for chest pain and palpitations.   Gastrointestinal: Negative for nausea and vomiting.   Neurological: Positive for weakness. Negative for seizures and headaches.   Psychiatric/Behavioral: Negative for agitation, confusion and decreased concentration.     Objective:     Vital Signs (Most Recent):  Temp: 98 °F (36.7 °C) (08/13/18 0346)  Pulse: 66 (08/13/18 0346)  Resp: 18 (08/13/18 0346)  BP: 138/84 (08/13/18 0346)  SpO2: (!) 94 % (08/13/18 0346) Vital Signs (24h Range):  Temp:  [97.8 °F (36.6 °C)-99 °F (37.2 °C)] 98 °F (36.7 °C)  Pulse:  [63-80] 66  Resp:  [16-18] 18  SpO2:  [94 %-97 %] 94 %  BP: (132-170)/(75-84) 138/84     Weight: 87.8 kg (193 lb 9.6 oz)  Body mass index is 27 kg/m².    Intake/Output Summary (Last 24 hours) at 8/13/2018 0846  Last data filed at 8/12/2018 1755  Gross per 24 hour   Intake --   Output 875 ml   Net -875 ml      Physical Exam   Constitutional: He is oriented to person, place, and time. He appears well-developed and well-nourished. No distress.   HENT:   Head: Normocephalic and atraumatic.   Eyes: Conjunctivae are normal. No scleral icterus.   Neck: No JVD present.   Cardiovascular: Normal rate and regular rhythm.   Pulmonary/Chest: Effort normal and breath sounds normal. No stridor. No respiratory distress. He has no wheezes.   Abdominal: There is no tenderness. There is no guarding.   Musculoskeletal: He  exhibits no edema or tenderness.        Right hip: He exhibits decreased strength.        Left hip: He exhibits decreased strength.   Neurological: He is alert and oriented to person, place, and time.   Skin: Skin is warm and dry. He is not diaphoretic.       Significant Labs:   CBC:   Recent Labs   Lab  08/12/18 0451 08/13/18   0453   WBC  7.42  9.04   HGB  11.2*  12.2*   HCT  34.2*  36.4*   PLT  275  282     CMP:   Recent Labs   Lab  08/12/18 0451 08/13/18   0453   NA  136  135*   K  4.4  3.9   CL  105  105   CO2  23  22*   GLU  102  94   BUN  27*  23   CREATININE  0.8  0.9   CALCIUM  8.9  8.7   ANIONGAP  8  8   EGFRNONAA  >60.0  >60.0     Recent Labs   Lab  08/12/18   1634   INR  1.0   APTT  24.2       Significant Imaging: I have reviewed all pertinent imaging results/findings within the past 24 hours.

## 2018-08-13 NOTE — SUBJECTIVE & OBJECTIVE
Interval History: naeon    Medications:  Continuous Infusions:   sodium chloride 0.9% 100 mL/hr at 08/11/18 1821     Scheduled Meds:   dexamethasone  6 mg Intravenous Q6H    gabapentin  300 mg Oral TID    heparin (porcine)  5,000 Units Subcutaneous Q8H    pantoprazole  40 mg Oral Daily    senna-docusate 8.6-50 mg  1 tablet Oral Daily    tamsulosin  0.4 mg Oral Nightly     PRN Meds:acetaminophen, bisacodyl, hydrALAZINE, HYDROmorphone, ondansetron, oxyCODONE-acetaminophen     Review of Systems  Objective:     Weight: 87.8 kg (193 lb 9.6 oz)  Body mass index is 27 kg/m².  Vital Signs (Most Recent):  Temp: 97.9 °F (36.6 °C) (08/13/18 0855)  Pulse: 66 (08/13/18 0855)  Resp: 18 (08/13/18 0855)  BP: (!) 162/89 (08/13/18 0855)  SpO2: 98 % (08/13/18 0855) Vital Signs (24h Range):  Temp:  [97.8 °F (36.6 °C)-99 °F (37.2 °C)] 97.9 °F (36.6 °C)  Pulse:  [63-80] 66  Resp:  [16-18] 18  SpO2:  [94 %-98 %] 98 %  BP: (132-170)/(75-89) 162/89                           Neurosurgery Physical Exam     Awake, alert, no acute distress  E4V5M6  CN II-XII grossly intact  SILT      Strength   Deltoids Triceps Biceps Wrist Extension Wrist Flexion Hand    Upper: R 5/5 5/5 5/5 5/5 5/5 5/5     L 5/5 5/5 5/5 5/5 5/5 5/5       Iliopsoas Quadriceps Knee  Flexion Tibialis  anterior Gastro- cnemius EHL   Lower: R 4-/5 4/5 4/5 5/5 5/5 5/5     L 3/5 4/5 4/5 5/5 5/5 5/5   I2+ reflexes symmetric  No watkins/clonus/babainski            Significant Labs:  Recent Labs   Lab  08/12/18   0451  08/13/18   0453   GLU  102  94   NA  136  135*   K  4.4  3.9   CL  105  105   CO2  23  22*   BUN  27*  23   CREATININE  0.8  0.9   CALCIUM  8.9  8.7     Recent Labs   Lab  08/12/18   0451  08/13/18   0453   WBC  7.42  9.04   HGB  11.2*  12.2*   HCT  34.2*  36.4*   PLT  275  282     Recent Labs   Lab  08/12/18   1634   INR  1.0   APTT  24.2     Microbiology Results (last 7 days)     ** No results found for the last 168 hours. **            Significant  Diagnostics:  CTA chest PE: 3 subsegmental PEs.

## 2018-08-13 NOTE — ASSESSMENT & PLAN NOTE
Likely 2/2 to metastatic prostate cancer  - Surgery postponed in favor of radiation therapy due to positive response on steroids  - Patient receiving 1st fraction of radiation therapy today  - Possible transfer back to [facility] for remainder of radiation therapy  - Continue with current medication regimen   dexamethasone 6q6.   gabapentin 300 tid.  - Manage as per neurosurgery

## 2018-08-13 NOTE — PROGRESS NOTES
[facility] Hospital Medicine  Progress Note    Patient Name: Edward Barber  MRN: [id number]  Patient Class: IP- Inpatient   Admission Date: 8/9/2018  Length of Stay: 4 days  Attending Physician: Zach Patel MD  Primary Care Provider: Primary Doctor [facility] Medicine Team: Networked reference to record PCT  Gurpreet Sal MD    Subjective:     Principal Problem:Mass of thoracic vertebra    HPI:  Mr. 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 Complains of 8/10 mid back pain that radiates to his chest. States symptoms have improved with administration of steroids. He denies urinary incontinence or difficulty urinating. Denies bowel incontinence, complains of constipation and decreased appetite.Scheduled to have surgery 8/13.    Hospital medicine was consulted for pre op clearance.    Hospital Course:  Surgery scheduled for 8/13 has been postponed until after radiation therapy.    Interval History: CTA significant for b/l segmental Pes of lower lobes and ascending aorta aneurysm. No overnight events. Neurosurgery postponed surgery. Patient scheduled to receive 1st radiation therapy treatment today. Will also receive MAG 3 scan for hydroureter/hydronephrosis secondary to prostate enlargement.    Review of Systems   Constitutional: Negative for chills and fever.   HENT: Negative for postnasal drip and rhinorrhea.    Eyes: Negative for photophobia and visual disturbance.   Respiratory: Negative for shortness of breath and wheezing.    Cardiovascular: Negative for chest pain and palpitations.   Gastrointestinal: Negative for nausea and vomiting.   Neurological: Positive for weakness. Negative for seizures and headaches.   Psychiatric/Behavioral:  Negative for agitation, confusion and decreased concentration.     Objective:     Vital Signs (Most Recent):  Temp: 98 °F (36.7 °C) (08/13/18 0346)  Pulse: 66 (08/13/18 0346)  Resp: 18 (08/13/18 0346)  BP: 138/84 (08/13/18 0346)  SpO2: (!) 94 % (08/13/18 0346) Vital Signs (24h Range):  Temp:  [97.8 °F (36.6 °C)-99 °F (37.2 °C)] 98 °F (36.7 °C)  Pulse:  [63-80] 66  Resp:  [16-18] 18  SpO2:  [94 %-97 %] 94 %  BP: (132-170)/(75-84) 138/84     Weight: 87.8 kg (193 lb 9.6 oz)  Body mass index is 27 kg/m².    Intake/Output Summary (Last 24 hours) at 8/13/2018 0846  Last data filed at 8/12/2018 1755  Gross per 24 hour   Intake --   Output 875 ml   Net -875 ml      Physical Exam   Constitutional: He is oriented to person, place, and time. He appears well-developed and well-nourished. No distress.   HENT:   Head: Normocephalic and atraumatic.   Eyes: Conjunctivae are normal. No scleral icterus.   Neck: No JVD present.   Cardiovascular: Normal rate and regular rhythm.   Pulmonary/Chest: Effort normal and breath sounds normal. No stridor. No respiratory distress. He has no wheezes.   Abdominal: There is no tenderness. There is no guarding.   Musculoskeletal: He exhibits no edema or tenderness.        Right hip: He exhibits decreased strength.        Left hip: He exhibits decreased strength.   Neurological: He is alert and oriented to person, place, and time.   Skin: Skin is warm and dry. He is not diaphoretic.       Significant Labs:   CBC:   Recent Labs   Lab  08/12/18 0451 08/13/18 0453   WBC  7.42  9.04   HGB  11.2*  12.2*   HCT  34.2*  36.4*   PLT  275  282     CMP:   Recent Labs   Lab  08/12/18 0451 08/13/18 0453   NA  136  135*   K  4.4  3.9   CL  105  105   CO2  23  22*   GLU  102  94   BUN  27*  23   CREATININE  0.8  0.9   CALCIUM  8.9  8.7   ANIONGAP  8  8   EGFRNONAA  >60.0  >60.0     Recent Labs   Lab  08/12/18   1634   INR  1.0   APTT  24.2       Significant Imaging: I have reviewed all pertinent imaging  results/findings within the past 24 hours.       Assessment/Plan:      * Mass of thoracic vertebra    Likely 2/2 to metastatic prostate cancer  - Surgery postponed in favor of radiation therapy due to positive response on steroids  - Patient receiving 1st fraction of radiation therapy today  - Possible transfer back to [facility] for remainder of radiation therapy  - Continue with current medication regimen   dexamethasone 6q6.   gabapentin 300 tid.  - Manage as per neurosurgery        Pulmonary emboli    CTA revealed bilateral segmental PEs of the lower lobes and lingula. No evidence of DVT on US. Dispo is unclear, and patient is uninsured, making best course of treatment unclear. We would like to use Lovenox, but that is unaffordable as outpatient. Warfarin is the most affordable option.  Patient is likely to be in a hospital ([facility] vs [facility]) for two weeks for 10 fractions of radiation therapy  - Will treat with Lovenox while in hospital  - Bridge to warfarin, starting today  - Can hold Lovenox 1 dose if urology plans nephrostomy tube. One dose of warfarin should not interfere with procedure        Thoracic ascending aortic aneurysm    5 cm thoracic aortic aneurysm seen on CT 8/10, No signs of rupture  No prior chest imaging to compare  Pt requires follow up and monitoring outpatient        Hydronephrosis    CT abdomen & pelvis shows obstructive mass in bladder causing obstruction of R. Ureter and R. hyrodoronephrosis  - Urology recommend non-urgent IR R nephrostomy as stenting unlikely to be successful given large bladder mass  - Follow up MAG 3 scan scheduled for 8/13  - Manage as per urology, with possible nephrostomy tube        Bilateral leg weakness    Likely 2/2 metastatic tumor from existing prostate cancer  Continue with current medication regimen  Neurosurgery following  Symptoms improving        Prostate cancer metastatic to bone    Currently on chemo and hormonal therapy  Appreciate oncology  reccs            Coronary artery disease involving native coronary artery of native heart without angina pectoris    CAD s/p stent placement 6yrs ago  - On ASA and Plavix at home  - May restart ASA until surgery rescheduled  - No indication to restart Plavix as stent was placed 6 years ago  - Last saw cardiologist 4 months ago for evaluation  - 2 D echo from 2017 showed normal EF and no significant valve abnormalities.   - No current signs or symptoms of CHF          VTE Risk Mitigation (From admission, onward)        Ordered     warfarin (COUMADIN) tablet 5 mg  Once      08/13/18 1210     enoxaparin injection 90 mg  Every 12 hours      08/13/18 1207     Place sequential compression device  Until discontinued      08/09/18 2255     IP VTE LOW RISK PATIENT  Once      08/09/18 2252              Gurpreet Sal MD  Department of Hospital Medicine   [facility]

## 2018-08-13 NOTE — PROGRESS NOTES
[facility]  Neurosurgery  Progress Note    Subjective:     History of Present Illness: 63 yo male with pmh of cad s/p stenting on asa 325 qd and clopidogrel 75 qd and known prostate CA undergoing active oral chemotherapy presents as direct transfer with four day history of progressive bilateral proximal leg weakness. Pt did not take anti-platelet regimen today.     On exam pt has paraparesis most severe in bilateral hip flexion and knee extension (4-/5). Pt also complains of radiculopathic thoracic pain in T4 and T5 dermatomes. There is a decreased but preserved sensation to light touch below T4. One beat of clonus on right. No watkins bilaterally. Normoreflexic bilaterally. Perineal sensation present and no complaints of bowel or bladder symptoms. Rectal exam deferred.     Outside constrasted neuroaxis imaging shows osteoblastic metastasis with three column involvement and epidural extension with canal obliteration at T4 and T5. Epidural extension also extends ventrally to T3. No acute fractures. Pt also with cervical spondylytic disease with canal stenosis and signal change at C4-C5. 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           Post-Op Info:  Procedure(s) (LRB):  LAMINECTOMY, SPINE, THORACIC, WITH FUSION T2-6 laminectomy and fusion (N/A)         Interval History: naeon    Medications:  Continuous Infusions:   sodium chloride 0.9% 100 mL/hr at 08/11/18 1821     Scheduled Meds:   dexamethasone  6 mg Intravenous Q6H    gabapentin  300 mg Oral TID    heparin (porcine)  5,000 Units Subcutaneous Q8H    pantoprazole  40 mg Oral Daily    senna-docusate 8.6-50 mg  1 tablet Oral Daily    tamsulosin  0.4 mg Oral Nightly     PRN Meds:acetaminophen, bisacodyl, hydrALAZINE, HYDROmorphone, ondansetron, oxyCODONE-acetaminophen     Review of Systems  Objective:     Weight: 87.8 kg (193 lb 9.6 oz)  Body mass  index is 27 kg/m².  Vital Signs (Most Recent):  Temp: 97.9 °F (36.6 °C) (08/13/18 0855)  Pulse: 66 (08/13/18 0855)  Resp: 18 (08/13/18 0855)  BP: (!) 162/89 (08/13/18 0855)  SpO2: 98 % (08/13/18 0855) Vital Signs (24h Range):  Temp:  [97.8 °F (36.6 °C)-99 °F (37.2 °C)] 97.9 °F (36.6 °C)  Pulse:  [63-80] 66  Resp:  [16-18] 18  SpO2:  [94 %-98 %] 98 %  BP: (132-170)/(75-89) 162/89                           Neurosurgery Physical Exam     Awake, alert, no acute distress  [id number]  CN II-XII grossly intact  SILT      Strength   Deltoids Triceps Biceps Wrist Extension Wrist Flexion Hand    Upper: R 5/5 5/5 5/5 5/5 5/5 5/5     L 5/5 5/5 5/5 5/5 5/5 5/5       Iliopsoas Quadriceps Knee  Flexion Tibialis  anterior Gastro- cnemius EHL   Lower: R 4-/5 4/5 4/5 5/5 5/5 5/5     L 3/5 4/5 4/5 5/5 5/5 5/5   I2+ reflexes symmetric  No watkins/clonus/babainski            Significant Labs:  Recent Labs   Lab  08/12/18   0451  08/13/18   0453   GLU  102  94   NA  136  135*   K  4.4  3.9   CL  105  105   CO2  23  22*   BUN  27*  23   CREATININE  0.8  0.9   CALCIUM  8.9  8.7     Recent Labs   Lab  08/12/18   0451  08/13/18   0453   WBC  7.42  9.04   HGB  11.2*  12.2*   HCT  34.2*  36.4*   PLT  275  282     Recent Labs   Lab  08/12/18   1634   INR  1.0   APTT  24.2     Microbiology Results (last 7 days)     ** No results found for the last 168 hours. **            Significant Diagnostics:  CTA chest PE: 3 subsegmental PEs.    Assessment/Plan:     * Mass of thoracic vertebra    62M with known metastatic prostate CA who presents with worsening thoracic radiculomyelopathy and a newly discovered T4 metastatic tumor with cord compression.     --Patient with improvements in BLE strength since starting steroids.   --SINS score of 6, pt denying any worsening pain with movement/standing as opposed to supine.  --CT thoracic spine shows no signs of overt instability  --Will cancel OR today and pursue radiation therapy today  --CT CAP with Suspected  small pulmonary embolus involving the posterior segment of the right lower lobe. Large mass closely approximated to the right lateral aspect of the urinary bladder with chronic obstruction of the right distal ureter and severe upstream chronic hydroureteronephrosis. Bulky retrocrural, retroperitoneal, and pelvic adenopathy, right side greater than left, worrisome for metastatic piotr disease. Ascending thoracic aortic aneurysm measuring 5 cm in maximum diameter. A 1.4 cm spiculated ground-glass opacity in the medial right lung apex. Suspected osseous metastatic lesion involving the T4 vertebral body.  --Will await further recs concerning PEs demonstrated on recent CT chest.  --Urology consulted given hydroureteronephrosis. Recommend f/u testosterone level, nonurgent IR placement of R nephrostomy tube.   --continue dexamethasone 6q6.  --continue gabapentin 300 tid.  --q4 neurochecks.  --q4 vital checks.  --Please hold home antiplatelet regimen.  --continue daily bowel regimen. Give suppository today for constipation. Will consider enema.   --SQH for DVTP  --IS to bedside  --resume home flomax  --Medicine following, appreciate recs.                      Gurpreet Subramanian, DO  Neurosurgery  [facility]

## 2018-08-13 NOTE — HOSPITAL COURSE
CTA revealed bilateral segmental PEs.  Surgery scheduled for 8/13 has been deferred in favor of radiation therapy.  Patient received 3 of 10 fractions of radiation therapy as of 8/15.  MAG3 scan shows no function of R kidney; no urological intervention planned.  Planned to transfer to [place] today for remainder of radiation therapy.

## 2018-08-13 NOTE — ASSESSMENT & PLAN NOTE
CT abdomen & pelvis shows obstructive mass in bladder causing obstruction of R. Ureter and R. hyrodoronephrosis  - Urology recommend non-urgent IR R nephrostomy as stenting unlikely to be successful given large bladder mass  - Follow up MAG 3 scan scheduled for 8/13  - Manage as per urology, with possible nephrostomy tube

## 2018-08-13 NOTE — PLAN OF CARE
Problem: Patient Care Overview  Goal: Plan of Care Review  NO ACUTE EVENTS DURING SHIFT. PT TOLERATED ALL ACTIVITIES WELL. PT VOIDING WITH NO DIFFICULTIES. PT PARTICIPATED WITH APPROPRIATE THERAPIES. PT RECEIVED FIRST RADIATION THERAPY TODAY. PT TOLERATED WELL. PT SURGICAL PROCEDURE WAS CANCELLED. ALL QUESTIONS ANSWERED. WILL ENDORSE CARE TO NOC RN.

## 2018-08-13 NOTE — NURSING
1207: Pt left for radiation therapy per transportation. Pt is wake alert and oriented. Pain is currently under control. Pt is stable. Will continue care upon pt arrival.

## 2018-08-13 NOTE — ASSESSMENT & PLAN NOTE
CAD s/p stent placement 6yrs ago  - On ASA and Plavix at home  - May restart ASA until surgery rescheduled  - No indication to restart Plavix as stent was placed 6 years ago  - Last saw cardiologist 4 months ago for evaluation  - 2 D echo from 2017 showed normal EF and no significant valve abnormalities.   - No current signs or symptoms of CHF

## 2018-08-13 NOTE — ASSESSMENT & PLAN NOTE
62M with known metastatic prostate CA who presents with worsening thoracic radiculomyelopathy and a newly discovered T4 metastatic tumor with cord compression.     --Patient with improvements in BLE strength since starting steroids.   --SINS score of 6, pt denying any worsening pain with movement/standing as opposed to supine.  --CT thoracic spine shows no signs of overt instability  --Will cancel OR today and pursue radiation therapy today  --CT CAP with Suspected small pulmonary embolus involving the posterior segment of the right lower lobe. Large mass closely approximated to the right lateral aspect of the urinary bladder with chronic obstruction of the right distal ureter and severe upstream chronic hydroureteronephrosis. Bulky retrocrural, retroperitoneal, and pelvic adenopathy, right side greater than left, worrisome for metastatic piotr disease. Ascending thoracic aortic aneurysm measuring 5 cm in maximum diameter. A 1.4 cm spiculated ground-glass opacity in the medial right lung apex. Suspected osseous metastatic lesion involving the T4 vertebral body.  --Will await further recs concerning PEs demonstrated on recent CT chest.  --Urology consulted given hydroureteronephrosis. Recommend f/u testosterone level, nonurgent IR placement of R nephrostomy tube.   --continue dexamethasone 6q6.  --continue gabapentin 300 tid.  --q4 neurochecks.  --q4 vital checks.  --Please hold home antiplatelet regimen.  --continue daily bowel regimen. Give suppository today for constipation. Will consider enema.   --SQH for DVTP  --IS to bedside  --resume home flomax  --Medicine following, appreciate recs.

## 2018-08-13 NOTE — ASSESSMENT & PLAN NOTE
CTA revealed bilateral segmental PEs of the lower lobes and lingula. No evidence of DVT on US. Dispo is unclear, and patient is uninsured, making best course of treatment unclear. We would like to use Lovenox, but that is unaffordable as outpatient. Warfarin is the most affordable option.  Patient is likely to be in a hospital ([facility] vs [facility]) for two weeks for 10 fractions of radiation therapy  - Will treat with Lovenox while in hospital  - Bridge to warfarin, starting today  - Can hold Lovenox 1 dose if urology plans nephrostomy tube. One dose of warfarin should not interfere with procedure

## 2018-08-13 NOTE — PLAN OF CARE
[facility] with [facility] to have patient transferred back to [place] for his continued treatment.  Patient was transferred to [facility] for surgical intervention and patient will not have surgery at this time.

## 2018-08-14 ENCOUNTER — DOCUMENTATION ONLY (OUTPATIENT)
Dept: RADIATION THERAPY | Facility: HOSPITAL | Age: 63
End: 2018-08-14

## 2018-08-14 LAB
ANION GAP SERPL CALC-SCNC: 11 MMOL/L
BASOPHILS # BLD AUTO: 0.01 K/UL
BASOPHILS NFR BLD: 0.1 %
BUN SERPL-MCNC: 23 MG/DL
CALCIUM SERPL-MCNC: 8.8 MG/DL
CHLORIDE SERPL-SCNC: 102 MMOL/L
CO2 SERPL-SCNC: 25 MMOL/L
CREAT SERPL-MCNC: 0.8 MG/DL
DIFFERENTIAL METHOD: ABNORMAL
EOSINOPHIL # BLD AUTO: 0 K/UL
EOSINOPHIL NFR BLD: 0 %
ERYTHROCYTE [DISTWIDTH] IN BLOOD BY AUTOMATED COUNT: 13.4 %
EST. GFR  (AFRICAN AMERICAN): >60 ML/MIN/1.73 M^2
EST. GFR  (NON AFRICAN AMERICAN): >60 ML/MIN/1.73 M^2
GLUCOSE SERPL-MCNC: 92 MG/DL
HCT VFR BLD AUTO: 37.9 %
HGB BLD-MCNC: 12.5 G/DL
IMM GRANULOCYTES # BLD AUTO: 0.09 K/UL
IMM GRANULOCYTES NFR BLD AUTO: 1.2 %
INR PPP: 1.1
LYMPHOCYTES # BLD AUTO: 1 K/UL
LYMPHOCYTES NFR BLD: 12.9 %
MCH RBC QN AUTO: 30.2 PG
MCHC RBC AUTO-ENTMCNC: 33 G/DL
MCV RBC AUTO: 92 FL
MONOCYTES # BLD AUTO: 0.6 K/UL
MONOCYTES NFR BLD: 7.3 %
NEUTROPHILS # BLD AUTO: 6.1 K/UL
NEUTROPHILS NFR BLD: 78.5 %
NRBC BLD-RTO: 0 /100 WBC
PLATELET # BLD AUTO: 316 K/UL
PMV BLD AUTO: 10.5 FL
POTASSIUM SERPL-SCNC: 4.1 MMOL/L
PROTHROMBIN TIME: 11 SEC
RBC # BLD AUTO: 4.14 M/UL
SODIUM SERPL-SCNC: 138 MMOL/L
WBC # BLD AUTO: 7.8 K/UL

## 2018-08-14 PROCEDURE — 99232 SBSQ HOSP IP/OBS MODERATE 35: CPT | Mod: ,,, | Performed by: PHYSICIAN ASSISTANT

## 2018-08-14 PROCEDURE — 77387 GUIDANCE FOR RADJ TX DLVR: CPT | Mod: ,,, | Performed by: RADIOLOGY

## 2018-08-14 PROCEDURE — 25000003 PHARM REV CODE 250: Performed by: STUDENT IN AN ORGANIZED HEALTH CARE EDUCATION/TRAINING PROGRAM

## 2018-08-14 PROCEDURE — 25000003 PHARM REV CODE 250: Performed by: PHYSICIAN ASSISTANT

## 2018-08-14 PROCEDURE — 77412 RADIATION TX DELIVERY LVL 3: CPT | Performed by: RADIOLOGY

## 2018-08-14 PROCEDURE — 63600175 PHARM REV CODE 636 W HCPCS: Performed by: STUDENT IN AN ORGANIZED HEALTH CARE EDUCATION/TRAINING PROGRAM

## 2018-08-14 PROCEDURE — 77387 GUIDANCE FOR RADJ TX DLVR: CPT | Mod: TC | Performed by: RADIOLOGY

## 2018-08-14 PROCEDURE — 80048 BASIC METABOLIC PNL TOTAL CA: CPT

## 2018-08-14 PROCEDURE — 63600175 PHARM REV CODE 636 W HCPCS: Performed by: PHYSICIAN ASSISTANT

## 2018-08-14 PROCEDURE — 85610 PROTHROMBIN TIME: CPT

## 2018-08-14 PROCEDURE — 20600001 HC STEP DOWN PRIVATE ROOM

## 2018-08-14 PROCEDURE — 36415 COLL VENOUS BLD VENIPUNCTURE: CPT

## 2018-08-14 PROCEDURE — 85025 COMPLETE CBC W/AUTO DIFF WBC: CPT

## 2018-08-14 RX ORDER — WARFARIN SODIUM 5 MG/1
5 TABLET ORAL ONCE
Status: COMPLETED | OUTPATIENT
Start: 2018-08-14 | End: 2018-08-14

## 2018-08-14 RX ORDER — AMOXICILLIN 250 MG
2 CAPSULE ORAL 2 TIMES DAILY
Status: DISCONTINUED | OUTPATIENT
Start: 2018-08-14 | End: 2018-08-15 | Stop reason: HOSPADM

## 2018-08-14 RX ORDER — ASPIRIN 81 MG/1
81 TABLET ORAL DAILY
Status: DISCONTINUED | OUTPATIENT
Start: 2018-08-14 | End: 2018-08-15 | Stop reason: HOSPADM

## 2018-08-14 RX ORDER — POLYETHYLENE GLYCOL 3350 17 G/17G
17 POWDER, FOR SOLUTION ORAL DAILY
Status: DISCONTINUED | OUTPATIENT
Start: 2018-08-15 | End: 2018-08-15 | Stop reason: HOSPADM

## 2018-08-14 RX ORDER — DEXAMETHASONE SODIUM PHOSPHATE 4 MG/ML
4 INJECTION, SOLUTION INTRA-ARTICULAR; INTRALESIONAL; INTRAMUSCULAR; INTRAVENOUS; SOFT TISSUE EVERY 6 HOURS
Status: DISCONTINUED | OUTPATIENT
Start: 2018-08-14 | End: 2018-08-15 | Stop reason: HOSPADM

## 2018-08-14 RX ADMIN — BISACODYL 10 MG: 10 SUPPOSITORY RECTAL at 06:08

## 2018-08-14 RX ADMIN — DEXAMETHASONE SODIUM PHOSPHATE 4 MG: 4 INJECTION, SOLUTION INTRAMUSCULAR; INTRAVENOUS at 06:08

## 2018-08-14 RX ADMIN — SENNOSIDES AND DOCUSATE SODIUM 1 TABLET: 8.6; 5 TABLET ORAL at 10:08

## 2018-08-14 RX ADMIN — Medication 0.5 MG: at 02:08

## 2018-08-14 RX ADMIN — DEXAMETHASONE SODIUM PHOSPHATE 4 MG: 4 INJECTION, SOLUTION INTRAMUSCULAR; INTRAVENOUS at 11:08

## 2018-08-14 RX ADMIN — Medication 0.5 MG: at 08:08

## 2018-08-14 RX ADMIN — SODIUM CHLORIDE: 0.9 INJECTION, SOLUTION INTRAVENOUS at 10:08

## 2018-08-14 RX ADMIN — SIMETHICONE CHEW TAB 80 MG 80 MG: 80 TABLET ORAL at 04:08

## 2018-08-14 RX ADMIN — Medication 0.5 MG: at 03:08

## 2018-08-14 RX ADMIN — DOCUSATE SODIUM -SENNOSIDES 2 TABLET: 50; 8.6 TABLET, COATED ORAL at 08:08

## 2018-08-14 RX ADMIN — ASPIRIN 81 MG: 81 TABLET, COATED ORAL at 10:08

## 2018-08-14 RX ADMIN — Medication 0.5 MG: at 12:08

## 2018-08-14 RX ADMIN — Medication 0.5 MG: at 07:08

## 2018-08-14 RX ADMIN — DEXAMETHASONE SODIUM PHOSPHATE 6 MG: 4 INJECTION, SOLUTION INTRAMUSCULAR; INTRAVENOUS at 12:08

## 2018-08-14 RX ADMIN — PANTOPRAZOLE SODIUM 40 MG: 40 TABLET, DELAYED RELEASE ORAL at 10:08

## 2018-08-14 RX ADMIN — Medication 0.5 MG: at 11:08

## 2018-08-14 RX ADMIN — TAMSULOSIN HYDROCHLORIDE 0.4 MG: 0.4 CAPSULE ORAL at 08:08

## 2018-08-14 RX ADMIN — WARFARIN SODIUM 5 MG: 5 TABLET ORAL at 06:08

## 2018-08-14 RX ADMIN — GABAPENTIN 300 MG: 300 CAPSULE ORAL at 08:08

## 2018-08-14 RX ADMIN — ENOXAPARIN SODIUM 90 MG: 100 INJECTION SUBCUTANEOUS at 09:08

## 2018-08-14 RX ADMIN — HYDRALAZINE HYDROCHLORIDE 10 MG: 20 INJECTION INTRAMUSCULAR; INTRAVENOUS at 04:08

## 2018-08-14 RX ADMIN — DEXAMETHASONE SODIUM PHOSPHATE 6 MG: 4 INJECTION, SOLUTION INTRAMUSCULAR; INTRAVENOUS at 04:08

## 2018-08-14 RX ADMIN — OXYCODONE HYDROCHLORIDE AND ACETAMINOPHEN 1 TABLET: 10; 325 TABLET ORAL at 08:08

## 2018-08-14 RX ADMIN — Medication 0.5 MG: at 10:08

## 2018-08-14 RX ADMIN — Medication 0.5 MG: at 04:08

## 2018-08-14 RX ADMIN — ENOXAPARIN SODIUM 90 MG: 100 INJECTION SUBCUTANEOUS at 10:08

## 2018-08-14 RX ADMIN — GABAPENTIN 300 MG: 300 CAPSULE ORAL at 10:08

## 2018-08-14 RX ADMIN — GABAPENTIN 300 MG: 300 CAPSULE ORAL at 03:08

## 2018-08-14 RX ADMIN — Medication 0.5 MG: at 06:08

## 2018-08-14 NOTE — SUBJECTIVE & OBJECTIVE
Interval History:   NAEON. Patient resting comfortably in bed. Reports some thoracic muscular pain. Denies any radiating pain, chest pain or SOB. Denies any UE or LE pain or weakness. BLE numbness continues to improve. Tolerating diet. Voiding appropriately.     Medications:  Continuous Infusions:   sodium chloride 0.9% 100 mL/hr at 08/14/18 1041     Scheduled Meds:   aspirin  81 mg Oral Daily    dexamethasone  6 mg Intravenous Q6H    enoxaparin  90 mg Subcutaneous Q12H    gabapentin  300 mg Oral TID    pantoprazole  40 mg Oral Daily    senna-docusate 8.6-50 mg  1 tablet Oral Daily    tamsulosin  0.4 mg Oral Nightly    warfarin  5 mg Oral Once     PRN Meds:acetaminophen, bisacodyl, ceFAZolin (ANCEF) IVPB, hydrALAZINE, HYDROmorphone, ondansetron, oxyCODONE-acetaminophen, simethicone     Review of Systems  Objective:     Weight: 87.8 kg (193 lb 9.6 oz)  Body mass index is 27 kg/m².  Vital Signs (Most Recent):  Temp: 97.2 °F (36.2 °C) (08/14/18 1113)  Pulse: 83 (08/14/18 1113)  Resp: 18 (08/14/18 1113)  BP: 126/75 (08/14/18 1113)  SpO2: 98 % (08/14/18 1113) Vital Signs (24h Range):  Temp:  [97.2 °F (36.2 °C)-98 °F (36.7 °C)] 97.2 °F (36.2 °C)  Pulse:  [57-91] 83  Resp:  [16-20] 18  SpO2:  [95 %-99 %] 98 %  BP: (126-181)/(69-86) 126/75     Date 08/14/18 0700 - 08/15/18 0659   Shift 3385-0130 1087-4908 2533-7530 24 Hour Total   INTAKE   Shift Total(mL/kg)       OUTPUT   Urine(mL/kg/hr)  375  375   Shift Total(mL/kg)  375(4.3)  375(4.3)   Weight (kg) 87.8 87.8 87.8 87.8       Neurosurgery Physical Exam   General: well developed, well nourished, no distress.   Head: normocephalic, atraumatic  Neurologic: Alert and oriented. Thought content appropriate.  GCS: Motor: 6/Verbal: 5/Eyes: 4 GCS Total: 15  Mental Status: Awake, Alert, Oriented x 4  Language: No aphasia  Speech: No dysarthria  Cranial nerves: face symmetric, tongue midline, CN II-XII grossly intact.   Eyes: pupils equal, round, reactive to light with  accomodation, EOMI.   Pulmonary: normal respirations, no signs of respiratory distress  Abdomen: soft, non-distended, not tender to palpation  Sensory: intact to light touch throughout    Motor Strength:Moves all extremities spontaneously with good tone. No abnormal movements seen.       Strength   Deltoids Triceps Biceps Wrist Extension Wrist Flexion Hand    Upper: R 5/5 5/5 5/5 5/5 5/5 5/5     L 5/5 5/5 5/5 5/5 5/5 5/5       Iliopsoas Quadriceps Knee  Flexion Tibialis  anterior Gastro- cnemius EHL   Lower: R 4/5 4+/5 4+/5 5/5 5/5 5/5     L 4+/5 4+/5 4+/5 5/5 5/5 5/5     Sargent: absent  Clonus: absent  Babinski: absent          Significant Labs:  Recent Labs   Lab  08/13/18   0453  08/14/18   0356   GLU  94  92   NA  135*  138   K  3.9  4.1   CL  105  102   CO2  22*  25   BUN  23  23   CREATININE  0.9  0.8   CALCIUM  8.7  8.8     Recent Labs   Lab  08/13/18 0453  08/14/18   0356   WBC  9.04  7.80   HGB  12.2*  12.5*   HCT  36.4*  37.9*   PLT  282  316     Recent Labs   Lab  08/14/18 0823   INR  1.1

## 2018-08-14 NOTE — PLAN OF CARE
08/14/18 1556   Discharge Reassessment   Assessment Type Discharge Planning Reassessment   Provided patient/caregiver education on the expected discharge date and the discharge plan No   Do you have any problems affording any of your prescribed medications? No   Discharge Plan A Home   Discharge Plan B Home with family   Patient choice form signed by patient/caregiver N/A   Can the patient answer the patient profile reliably? Yes, cognitively intact   How does the patient rate their overall health at the present time? Fair   How often would a person be available to care for the patient? Often   During the past month, has the patient often been bothered by feeling down, depressed or hopeless? No   During the past month, has the patient often been bothered by little interest or pleasure in doing things? No

## 2018-08-14 NOTE — ASSESSMENT & PLAN NOTE
Bilateral segmental PEs of the lower lobes and lingula. No evidence of DVT on US. Pt is uninsured and cannot afford long-term Lovenox or NoAC as outpatient.  - Started on Lovenox with bridge to warfarin, goal INR 2-3.  - Will likely be in hospital for radiation therapy for two weeks  - No surgical interventions planned

## 2018-08-14 NOTE — ASSESSMENT & PLAN NOTE
Likely 2/2 metastatic tumor from existing prostate cancer  - Recommend PT/OT  - Continue with current medication regimen  - Neurosurgery following  - Symptoms improving

## 2018-08-14 NOTE — ASSESSMENT & PLAN NOTE
Obstructive mass around bladder causing obstruction of R ureter and hydronephrosis. MAG 3 scan shows R kidney is nonfunctional  - No intervention per urology  - L kidney with moderate impairment  - Overall kidney function wnl

## 2018-08-14 NOTE — ASSESSMENT & PLAN NOTE
Likely 2/2 to metastatic prostate cancer.  - Surgery postponed in favor of radiation therapy due to positive response on steroids  - Patient received 1st fraction of radiation therapy Monday 8/13  - Continue with current medication regimen,  dexamethasone 6q6, gabapentin 300 tid.  - Manage as per neurosurgery and radiation oncology  - Patient can transfer back to [place] for remainer of radiation therapy, or stay in [facility] with transfer from neurosurgery to medicine.  - Follow up CM transfer status

## 2018-08-14 NOTE — SUBJECTIVE & OBJECTIVE
Interval History: No events overnight. Patient feels his LE pain is slightly exacerbated following radiation treatment, but pain is well-controlled.    Review of Systems   Constitutional: Negative for chills and fever.   HENT: Negative for postnasal drip and rhinorrhea.    Eyes: Negative for photophobia and visual disturbance.   Respiratory: Negative for shortness of breath and wheezing.    Cardiovascular: Negative for chest pain and palpitations.   Gastrointestinal: Negative for nausea and vomiting.   Neurological: Positive for weakness. Negative for seizures and headaches.   Psychiatric/Behavioral: Negative for agitation, confusion and decreased concentration.     Objective:     Vital Signs (Most Recent):  Temp: 97.2 °F (36.2 °C) (08/14/18 1113)  Pulse: 83 (08/14/18 1113)  Resp: 18 (08/14/18 1113)  BP: 126/75 (08/14/18 1113)  SpO2: 98 % (08/14/18 1113) Vital Signs (24h Range):  Temp:  [97.2 °F (36.2 °C)-98.2 °F (36.8 °C)] 97.2 °F (36.2 °C)  Pulse:  [56-91] 83  Resp:  [16-20] 18  SpO2:  [94 %-99 %] 98 %  BP: (126-181)/(69-86) 126/75     Weight: 87.8 kg (193 lb 9.6 oz)  Body mass index is 27 kg/m².    Intake/Output Summary (Last 24 hours) at 8/14/2018 1126  Last data filed at 8/13/2018 2000  Gross per 24 hour   Intake 240 ml   Output 1000 ml   Net -760 ml      Physical Exam   Constitutional: He is oriented to person, place, and time. He appears well-developed and well-nourished. No distress.   HENT:   Head: Normocephalic and atraumatic.   Eyes: Conjunctivae are normal. No scleral icterus.   Neck: No JVD present.   Cardiovascular: Normal rate and regular rhythm.   Pulmonary/Chest: Effort normal and breath sounds normal. No stridor. No respiratory distress. He has no wheezes.   Abdominal: There is no tenderness. There is no guarding.   Musculoskeletal: He exhibits no edema or tenderness.        Right hip: He exhibits decreased strength.        Left hip: He exhibits decreased strength.   Neurological: He is alert and  oriented to person, place, and time.   Skin: Skin is warm and dry. He is not diaphoretic.       Significant Labs:   CBC:   Recent Labs   Lab  08/13/18   0453  08/14/18   0356   WBC  9.04  7.80   HGB  12.2*  12.5*   HCT  36.4*  37.9*   PLT  282  316     CMP:   Recent Labs   Lab  08/13/18   0453  08/14/18   0356   NA  135*  138   K  3.9  4.1   CL  105  102   CO2  22*  25   GLU  94  92   BUN  23  23   CREATININE  0.9  0.8   CALCIUM  8.7  8.8   ANIONGAP  8  11   EGFRNONAA  >60.0  >60.0     Coagulation:   Recent Labs   Lab  08/12/18   1634  08/14/18   0823   INR  1.0  1.1   APTT  24.2   --        Significant Imaging: I have reviewed all pertinent imaging results/findings within the past 24 hours.

## 2018-08-14 NOTE — PROGRESS NOTES
[facility]  Neurosurgery  Progress Note    Subjective:     History of Present Illness: 61 yo male with pmh of cad s/p stenting on asa 325 qd and clopidogrel 75 qd and known prostate CA undergoing active oral chemotherapy presents as direct transfer with four day history of progressive bilateral proximal leg weakness. Pt did not take anti-platelet regimen today.     On exam pt has paraparesis most severe in bilateral hip flexion and knee extension (4-/5). Pt also complains of radiculopathic thoracic pain in T4 and T5 dermatomes. There is a decreased but preserved sensation to light touch below T4. One beat of clonus on right. No watkins bilaterally. Normoreflexic bilaterally. Perineal sensation present and no complaints of bowel or bladder symptoms. Rectal exam deferred.     Outside constrasted neuroaxis imaging shows osteoblastic metastasis with three column involvement and epidural extension with canal obliteration at T4 and T5. Epidural extension also extends ventrally to T3. No acute fractures. Pt also with cervical spondylytic disease with canal stenosis and signal change at C4-C5. 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           Post-Op Info:  Procedure(s) (LRB):  LAMINECTOMY, SPINE, THORACIC, WITH FUSION T2-6 laminectomy and fusion (N/A)   1 Day Post-Op     Interval History:   NAEON. Patient resting comfortably in bed. Reports some thoracic muscular pain. Denies any radiating pain, chest pain or SOB. Denies any UE or LE pain or weakness. BLE numbness continues to improve. Tolerating diet. Voiding appropriately.     Medications:  Continuous Infusions:   sodium chloride 0.9% 100 mL/hr at 08/14/18 1041     Scheduled Meds:   aspirin  81 mg Oral Daily    dexamethasone  6 mg Intravenous Q6H    enoxaparin  90 mg Subcutaneous Q12H    gabapentin  300 mg Oral TID    pantoprazole  40 mg Oral Daily     senna-docusate 8.6-50 mg  1 tablet Oral Daily    tamsulosin  0.4 mg Oral Nightly    warfarin  5 mg Oral Once     PRN Meds:acetaminophen, bisacodyl, ceFAZolin (ANCEF) IVPB, hydrALAZINE, HYDROmorphone, ondansetron, oxyCODONE-acetaminophen, simethicone     Review of Systems  Objective:     Weight: 87.8 kg (193 lb 9.6 oz)  Body mass index is 27 kg/m².  Vital Signs (Most Recent):  Temp: 97.2 °F (36.2 °C) (08/14/18 1113)  Pulse: 83 (08/14/18 1113)  Resp: 18 (08/14/18 1113)  BP: 126/75 (08/14/18 1113)  SpO2: 98 % (08/14/18 1113) Vital Signs (24h Range):  Temp:  [97.2 °F (36.2 °C)-98 °F (36.7 °C)] 97.2 °F (36.2 °C)  Pulse:  [57-91] 83  Resp:  [16-20] 18  SpO2:  [95 %-99 %] 98 %  BP: (126-181)/(69-86) 126/75     Date 08/14/18 0700 - 08/15/18 0659   Shift 2067-8185 9449-2362 3975-7071 24 Hour Total   INTAKE   Shift Total(mL/kg)       OUTPUT   Urine(mL/kg/hr)  375  375   Shift Total(mL/kg)  375(4.3)  375(4.3)   Weight (kg) 87.8 87.8 87.8 87.8       Neurosurgery Physical Exam   General: well developed, well nourished, no distress.   Head: normocephalic, atraumatic  Neurologic: Alert and oriented. Thought content appropriate.  GCS: Motor: 6/Verbal: 5/Eyes: 4 GCS Total: 15  Mental Status: Awake, Alert, Oriented x 4  Language: No aphasia  Speech: No dysarthria  Cranial nerves: face symmetric, tongue midline, CN II-XII grossly intact.   Eyes: pupils equal, round, reactive to light with accomodation, EOMI.   Pulmonary: normal respirations, no signs of respiratory distress  Abdomen: soft, non-distended, not tender to palpation  Sensory: intact to light touch throughout    Motor Strength:Moves all extremities spontaneously with good tone. No abnormal movements seen.       Strength   Deltoids Triceps Biceps Wrist Extension Wrist Flexion Hand    Upper: R 5/5 5/5 5/5 5/5 5/5 5/5     L 5/5 5/5 5/5 5/5 5/5 5/5       Iliopsoas Quadriceps Knee  Flexion Tibialis  anterior Gastro- cnemius EHL   Lower: R 4/5 4+/5 4+/5 5/5 5/5 5/5     L  4+/5 4+/5 4+/5 5/5 5/5 5/5     Sargent: absent  Clonus: absent  Babinski: absent          Significant Labs:  Recent Labs   Lab  08/13/18   0453  08/14/18   0356   GLU  94  92   NA  135*  138   K  3.9  4.1   CL  105  102   CO2  22*  25   BUN  23  23   CREATININE  0.9  0.8   CALCIUM  8.7  8.8     Recent Labs   Lab  08/13/18   0453  08/14/18   0356   WBC  9.04  7.80   HGB  12.2*  12.5*   HCT  36.4*  37.9*   PLT  282  316     Recent Labs   Lab  08/14/18   0823   INR  1.1         Assessment/Plan:     * Mass of thoracic vertebra    62M with known metastatic prostate CA who presents with worsening thoracic radiculomyelopathy and a newly discovered T4 metastatic tumor with cord compression.     -Patient neurologically stable on exam  -Thoracic mass: CT thoracic spine shows no signs of overt instability. No neurosurgical intervention recommended due to multiple medical co morbidities. Completed 2/10 XRT on 8/14. Decrease Dex to 4q6. Protonix for PPI.   -Pulmonary Emboli: Bilateral segmental PEs of the lower lobes and lingula. No evidence of DVT on US. On Lovenox with bridge to warfarin, goal INR 2-3. Continue management per Medicine.   -Thoracic ascending aortic aneurysm: 5 cm thoracic aortic aneurysm seen on CT 8/10. No signs of rupture. Requires follow up and monitoring outpatient. Continue management per Medicine.   -Hydronephrosis: Obstructive mass around bladder causing obstruction of R ureter and hydronephrosis. MAG 3 scan shows R kidney is nonfunctional. No intervention recommended. Overall kidney function WNL. Appreciate Urology recs.   -Urinary retention: Continue home dose of Flomax daily and bladder scan PRN.   -Prostate cancer metastatic to bone: Currently on chemo and hormonal therapy. Continue management per outpatient Oncologist.   -Continue daily bowel regimen.   -Continue LINDA's, SCD's, and Lovenox for DVTP    DISPO: Pending return to [place] vs transfer to [facility] Medicine service. Spoke with  transfer center today who was unable to get the admitting physician at the OSH on the phone. Will continue to try.                     Please call with any questions      Susana Bruce PA-C   Neurosurgery   Pager: [phone]

## 2018-08-14 NOTE — ASSESSMENT & PLAN NOTE
CAD s/p stent placement 6yrs ago  - On ASA and Plavix at home  - Restarting aspirin as no surgical interventions planned  - No indication to restart Plavix as stent was placed 6 years ago  - Last saw cardiologist 4 months ago for evaluation  - 2 D echo from 2017 showed normal EF and no significant valve abnormalities.   - No current signs or symptoms of CHF

## 2018-08-14 NOTE — PROGRESS NOTES
[facility] Hospital Medicine  Progress Note    Patient Name: Edward Barber  MRN: [id number]  Patient Class: IP- Inpatient   Admission Date: 8/9/2018  Length of Stay: 5 days  Attending Physician: Zach Patel MD  Primary Care Provider: Primary Doctor [facility] Medicine Team: Networked reference to record PCT  Gurpreet Sal MD    Subjective:     Principal Problem:Mass of thoracic vertebra    HPI:  Mr. 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 Complains of 8/10 mid back pain that radiates to his chest. States symptoms have improved with administration of steroids. He denies urinary incontinence or difficulty urinating. Denies bowel incontinence, complains of constipation and decreased appetite.Scheduled to have surgery 8/13.    Hospital medicine was consulted for pre op clearance, possible Pes seen on CT.    Hospital Course:  CTA revealed bilateral segmental PEs.  Surgery scheduled for 8/13 has been postponed until after radiation therapy.  Patient received first of 10 fractions of radiation therapy Monday 8/13.  MAG3 scan shows no function of R kidney; no urological intervention planned.      Interval History: No events overnight. Patient feels his LE pain is slightly exacerbated following radiation treatment, but pain is well-controlled.    Review of Systems   Constitutional: Negative for chills and fever.   HENT: Negative for postnasal drip and rhinorrhea.    Eyes: Negative for photophobia and visual disturbance.   Respiratory: Negative for shortness of breath and wheezing.    Cardiovascular: Negative for chest pain and palpitations.   Gastrointestinal: Negative for nausea and vomiting.   Neurological: Positive for weakness. Negative for  seizures and headaches.   Psychiatric/Behavioral: Negative for agitation, confusion and decreased concentration.     Objective:     Vital Signs (Most Recent):  Temp: 97.2 °F (36.2 °C) (08/14/18 1113)  Pulse: 83 (08/14/18 1113)  Resp: 18 (08/14/18 1113)  BP: 126/75 (08/14/18 1113)  SpO2: 98 % (08/14/18 1113) Vital Signs (24h Range):  Temp:  [97.2 °F (36.2 °C)-98.2 °F (36.8 °C)] 97.2 °F (36.2 °C)  Pulse:  [56-91] 83  Resp:  [16-20] 18  SpO2:  [94 %-99 %] 98 %  BP: (126-181)/(69-86) 126/75     Weight: 87.8 kg (193 lb 9.6 oz)  Body mass index is 27 kg/m².    Intake/Output Summary (Last 24 hours) at 8/14/2018 1126  Last data filed at 8/13/2018 2000  Gross per 24 hour   Intake 240 ml   Output 1000 ml   Net -760 ml      Physical Exam   Constitutional: He is oriented to person, place, and time. He appears well-developed and well-nourished. No distress.   HENT:   Head: Normocephalic and atraumatic.   Eyes: Conjunctivae are normal. No scleral icterus.   Neck: No JVD present.   Cardiovascular: Normal rate and regular rhythm.   Pulmonary/Chest: Effort normal and breath sounds normal. No stridor. No respiratory distress. He has no wheezes.   Abdominal: There is no tenderness. There is no guarding.   Musculoskeletal: He exhibits no edema or tenderness.        Right hip: He exhibits decreased strength.        Left hip: He exhibits decreased strength.   Neurological: He is alert and oriented to person, place, and time.   Skin: Skin is warm and dry. He is not diaphoretic.       Significant Labs:   CBC:   Recent Labs   Lab  08/13/18 0453  08/14/18   0356   WBC  9.04  7.80   HGB  12.2*  12.5*   HCT  36.4*  37.9*   PLT  282  316     CMP:   Recent Labs   Lab  08/13/18   0453  08/14/18   0356   NA  135*  138   K  3.9  4.1   CL  105  102   CO2  22*  25   GLU  94  92   BUN  23  23   CREATININE  0.9  0.8   CALCIUM  8.7  8.8   ANIONGAP  8  11   EGFRNONAA  >60.0  >60.0     Coagulation:   Recent Labs   Lab  08/12/18   1634  08/14/18   0823    INR  1.0  1.1   APTT  24.2   --        Significant Imaging: I have reviewed all pertinent imaging results/findings within the past 24 hours.    Assessment/Plan:      * Mass of thoracic vertebra    Likely 2/2 to metastatic prostate cancer.  - Surgery postponed in favor of radiation therapy due to positive response on steroids  - Patient received 1st fraction of radiation therapy Monday 8/13  - Continue with current medication regimen,  dexamethasone 6q6, gabapentin 300 tid.  - Manage as per neurosurgery and radiation oncology  - Patient can transfer back to [place] for remainer of radiation therapy, or stay in [facility] with transfer from neurosurgery to medicine.  - Follow up CM transfer status        Pulmonary emboli    Bilateral segmental PEs of the lower lobes and lingula. No evidence of DVT on US. Pt is uninsured and cannot afford long-term Lovenox or NoAC as outpatient.  - Started on Lovenox with bridge to warfarin, goal INR 2-3.  - Will likely be in hospital for radiation therapy for two weeks  - No surgical interventions planned        Thoracic ascending aortic aneurysm    5 cm thoracic aortic aneurysm seen on CT 8/10, No signs of rupture  No prior chest imaging to compare  Pt requires follow up and monitoring outpatient        Hydronephrosis    Obstructive mass around bladder causing obstruction of R ureter and hydronephrosis. MAG 3 scan shows R kidney is nonfunctional  - No intervention per urology  - L kidney with moderate impairment  - Overall kidney function wnl        Bilateral leg weakness    Likely 2/2 metastatic tumor from existing prostate cancer  - Recommend PT/OT  - Continue with current medication regimen  - Neurosurgery following  - Symptoms improving        Prostate cancer metastatic to bone    Currently on chemo and hormonal therapy  Appreciate oncology recs        Coronary artery disease involving native coronary artery of native heart without angina pectoris    CAD s/p stent placement  6yrs ago  - On ASA and Plavix at home  - Restarting aspirin as no surgical interventions planned  - No indication to restart Plavix as stent was placed 6 years ago  - Last saw cardiologist 4 months ago for evaluation  - 2 D echo from 2017 showed normal EF and no significant valve abnormalities.   - No current signs or symptoms of CHF          VTE Risk Mitigation (From admission, onward)        Ordered     warfarin (COUMADIN) tablet 5 mg  Once      08/14/18 1125     enoxaparin injection 90 mg  Every 12 hours      08/13/18 1207     Place sequential compression device  Until discontinued      08/09/18 2920              Gurpreet Sal MD  Department of Hospital Medicine   [facility]

## 2018-08-14 NOTE — ASSESSMENT & PLAN NOTE
- Patient has progress of disease on ADT and enzalutimide despite castrate levels of testosterone.   - Continue radiation therapy  - MAG 3 scan obtained yesterday, shows no function in right kidney  - No need for further intervention as right kidney is not contributing to overall renal function  - Do not recommend nephrostomy tube placement

## 2018-08-14 NOTE — PROGRESS NOTES
"[facility]  Urology  Progress Note    Patient Name: Edward Barber  MRN: [id number]  Admission Date: 8/9/2018  Hospital Length of Stay: 5 days  Code Status: Full Code   Attending Provider: Kareem De La Cruz MD  Primary Care Physician: Primary Doctor No    Subjective:     HPI:  61 y/o male with past medical history of CAD with previous cardiac stent in 2012 and known metastatic prostate cancer to the bone presented as transfer from [facility] for thoracic cord compression and pathologic fracture related to metastatic T4 tumor related to his prostate cancer. Patient presented with progressive bilateral lower extremity weakness and upper thoracic spine pain. Patient admitted to Neurosurgery service who plans T-spine laminectomy and posterior fusion T2-T6 on 8/13. Urology was consulted for findings of severe hydronephrosis on his right side secondary to a large pelvic mass. He says he has had intermittent right lower quadrant pain but has not had right flank pain. His creatinine is normal.     Per chart review and per the patient he was originally diagnosed with prostate cancer in 2015. At that time he was told his condition was "chronic" and was started on Xtandi and Trelstar, suggestive of metastatic disease at presentation. He now sees Dr. Sotelo in [place]. PSA is currently 938. His PSA in March was 175 and in December '17 was 73.     Interval History:   No acute events overnight  Having increase in back pain since radiation yesterday  Ambulating with assistance  No voiding issues    Review of Systems  Objective:     Temp:  [97.5 °F (36.4 °C)-98.2 °F (36.8 °C)] 97.6 °F (36.4 °C)  Pulse:  [56-66] 61  Resp:  [16-18] 16  SpO2:  [94 %-98 %] 98 %  BP: (133-181)/(78-89) 181/86     Body mass index is 27 kg/m².            Drains          None          Physical Exam   Constitutional: No distress.   HENT:   Head: Normocephalic and atraumatic.   Eyes: EOM are normal. No scleral icterus.   Cardiovascular: " Normal rate and regular rhythm.    Pulmonary/Chest: Effort normal. No respiratory distress.   Abdominal: Soft. He exhibits distension. There is no tenderness.   No cvat bilaterally   Musculoskeletal: He exhibits no edema.   Neurological: He is alert.   Skin: Skin is warm and dry. He is not diaphoretic.     Psychiatric: He has a normal mood and affect. His behavior is normal.       Significant Labs:    BMP:  Recent Labs   Lab  08/12/18 0451 08/13/18 0453  08/14/18   0356   NA  136  135*  138   K  4.4  3.9  4.1   CL  105  105  102   CO2  23  22*  25   BUN  27*  23  23   CREATININE  0.8  0.9  0.8   CALCIUM  8.9  8.7  8.8       CBC:   Recent Labs   Lab  08/12/18 0451 08/13/18 0453 08/14/18   0356   WBC  7.42  9.04  7.80   HGB  11.2*  12.2*  12.5*   HCT  34.2*  36.4*  37.9*   PLT  275  282  316       Urine Studies:   Recent Labs   Lab  08/12/18   1100   COLORU  Yellow   APPEARANCEUA  Clear   PHUR  5.0   SPECGRAV  1.025   PROTEINUA  Negative   GLUCUA  Negative   KETONESU  Negative   BILIRUBINUA  Negative   OCCULTUA  Negative   NITRITE  Negative   UROBILINOGEN  Negative   LEUKOCYTESUR  Negative       Significant Imaging:  All pertinent imaging results/findings from the past 24 hours have been reviewed.      Assessment/Plan:     Hydronephrosis    See prostate cancer        Prostate cancer metastatic to bone    - Patient has progress of disease on ADT and enzalutimide despite castrate levels of testosterone.   - Continue palliative radiation therapy  - MAG 3 scan obtained yesterday, shows no function in right kidney  - No need for further intervention as right kidney is not contributing to overall renal function  - Do not recommend nephrostomy tube placement  - Continue to follow up with Dr. Sotelo in [place]          Will sign off, please call with any questions.     VTE Risk Mitigation (From admission, onward)        Ordered     enoxaparin injection 90 mg  Every 12 hours      08/13/18 1207     Place sequential  compression device  Until discontinued      08/09/18 3257          Cristy Jarrett MD  Urology  [facility]

## 2018-08-14 NOTE — SUBJECTIVE & OBJECTIVE
Interval History:   No acute events overnight  Having increase in back pain since radiation yesterday  Ambulating with assistance  No voiding issues    Review of Systems  Objective:     Temp:  [97.5 °F (36.4 °C)-98.2 °F (36.8 °C)] 97.6 °F (36.4 °C)  Pulse:  [56-66] 61  Resp:  [16-18] 16  SpO2:  [94 %-98 %] 98 %  BP: (133-181)/(78-89) 181/86     Body mass index is 27 kg/m².            Drains          None          Physical Exam   Constitutional: No distress.   HENT:   Head: Normocephalic and atraumatic.   Eyes: EOM are normal. No scleral icterus.   Cardiovascular: Normal rate and regular rhythm.    Pulmonary/Chest: Effort normal. No respiratory distress.   Abdominal: Soft. He exhibits distension. There is no tenderness.   No cvat bilaterally   Musculoskeletal: He exhibits no edema.   Neurological: He is alert.   Skin: Skin is warm and dry. He is not diaphoretic.     Psychiatric: He has a normal mood and affect. His behavior is normal.       Significant Labs:    BMP:  Recent Labs   Lab  08/12/18   0451  08/13/18   0453  08/14/18   0356   NA  136  135*  138   K  4.4  3.9  4.1   CL  105  105  102   CO2  23  22*  25   BUN  27*  23  23   CREATININE  0.8  0.9  0.8   CALCIUM  8.9  8.7  8.8       CBC:   Recent Labs   Lab  08/12/18   0451  08/13/18   0453  08/14/18   0356   WBC  7.42  9.04  7.80   HGB  11.2*  12.2*  12.5*   HCT  34.2*  36.4*  37.9*   PLT  275  282  316       Urine Studies:   Recent Labs   Lab  08/12/18   1100   COLORU  Yellow   APPEARANCEUA  Clear   PHUR  5.0   SPECGRAV  1.025   PROTEINUA  Negative   GLUCUA  Negative   KETONESU  Negative   BILIRUBINUA  Negative   OCCULTUA  Negative   NITRITE  Negative   UROBILINOGEN  Negative   LEUKOCYTESUR  Negative       Significant Imaging:  All pertinent imaging results/findings from the past 24 hours have been reviewed.

## 2018-08-14 NOTE — ASSESSMENT & PLAN NOTE
62M with known metastatic prostate CA who presents with worsening thoracic radiculomyelopathy and a newly discovered T4 metastatic tumor with cord compression.     -Patient neurologically stable on exam  -Thoracic mass: CT thoracic spine shows no signs of overt instability. No neurosurgical intervention recommended due to multiple medical co morbidities. Completed 2/10 XRT on 8/14. Decrease Dex to 4q6. Protonix for PPI.   -Pulmonary Emboli: Bilateral segmental PEs of the lower lobes and lingula. No evidence of DVT on US. On Lovenox with bridge to warfarin, goal INR 2-3. Continue management per Medicine.   -Thoracic ascending aortic aneurysm: 5 cm thoracic aortic aneurysm seen on CT 8/10. No signs of rupture. Requires follow up and monitoring outpatient. Continue management per Medicine.   -Hydronephrosis: Obstructive mass around bladder causing obstruction of R ureter and hydronephrosis. MAG 3 scan shows R kidney is nonfunctional. No intervention recommended. Overall kidney function WNL. Appreciate Urology recs.   -Urinary retention: Continue home dose of Flomax daily and bladder scan PRN.   -Prostate cancer metastatic to bone: Currently on chemo and hormonal therapy. Continue management per outpatient Oncologist.   -Continue daily bowel regimen.   -Continue LINDA's, SCD's, and Lovenox for DVTP    DISPO: Pending return to [place] vs transfer to [facility] Medicine service. Spoke with transfer center today who was unable to get the admitting physician at the OSH on the phone. Will continue to try.

## 2018-08-14 NOTE — PLAN OF CARE
Problem: Patient Care Overview  Goal: Plan of Care Review  Outcome: Ongoing (interventions implemented as appropriate)  Pt complains of constant pain, some relief with dilaudid IVP, IVFS infusing per orders, vss, report given to Naida DUKE @2803

## 2018-08-15 ENCOUNTER — DOCUMENTATION ONLY (OUTPATIENT)
Dept: RADIATION ONCOLOGY | Facility: CLINIC | Age: 63
End: 2018-08-15

## 2018-08-15 ENCOUNTER — DOCUMENTATION ONLY (OUTPATIENT)
Dept: RADIATION THERAPY | Facility: HOSPITAL | Age: 63
End: 2018-08-15

## 2018-08-15 VITALS
TEMPERATURE: 98 F | HEART RATE: 71 BPM | BODY MASS INDEX: 27.11 KG/M2 | HEIGHT: 71 IN | SYSTOLIC BLOOD PRESSURE: 132 MMHG | WEIGHT: 193.63 LBS | DIASTOLIC BLOOD PRESSURE: 96 MMHG | OXYGEN SATURATION: 93 % | RESPIRATION RATE: 16 BRPM

## 2018-08-15 LAB
ANION GAP SERPL CALC-SCNC: 10 MMOL/L
BASOPHILS # BLD AUTO: 0.01 K/UL
BASOPHILS NFR BLD: 0.1 %
BUN SERPL-MCNC: 24 MG/DL
CALCIUM SERPL-MCNC: 8.6 MG/DL
CHLORIDE SERPL-SCNC: 104 MMOL/L
CO2 SERPL-SCNC: 23 MMOL/L
CREAT SERPL-MCNC: 0.8 MG/DL
DIFFERENTIAL METHOD: ABNORMAL
EOSINOPHIL # BLD AUTO: 0 K/UL
EOSINOPHIL NFR BLD: 0.1 %
ERYTHROCYTE [DISTWIDTH] IN BLOOD BY AUTOMATED COUNT: 13.7 %
EST. GFR  (AFRICAN AMERICAN): >60 ML/MIN/1.73 M^2
EST. GFR  (NON AFRICAN AMERICAN): >60 ML/MIN/1.73 M^2
GLUCOSE SERPL-MCNC: 90 MG/DL
HCT VFR BLD AUTO: 36.4 %
HGB BLD-MCNC: 12 G/DL
IMM GRANULOCYTES # BLD AUTO: 0.1 K/UL
IMM GRANULOCYTES NFR BLD AUTO: 1.3 %
INR PPP: 1.3
LYMPHOCYTES # BLD AUTO: 1.1 K/UL
LYMPHOCYTES NFR BLD: 14.3 %
MCH RBC QN AUTO: 30.5 PG
MCHC RBC AUTO-ENTMCNC: 33 G/DL
MCV RBC AUTO: 92 FL
MONOCYTES # BLD AUTO: 0.5 K/UL
MONOCYTES NFR BLD: 6.8 %
NEUTROPHILS # BLD AUTO: 6.2 K/UL
NEUTROPHILS NFR BLD: 77.4 %
NRBC BLD-RTO: 0 /100 WBC
PLATELET # BLD AUTO: 318 K/UL
PMV BLD AUTO: 10.4 FL
POTASSIUM SERPL-SCNC: 3.8 MMOL/L
PROTHROMBIN TIME: 13.4 SEC
RBC # BLD AUTO: 3.94 M/UL
SODIUM SERPL-SCNC: 137 MMOL/L
WBC # BLD AUTO: 7.98 K/UL

## 2018-08-15 PROCEDURE — 77387 GUIDANCE FOR RADJ TX DLVR: CPT | Mod: TC | Performed by: RADIOLOGY

## 2018-08-15 PROCEDURE — 80048 BASIC METABOLIC PNL TOTAL CA: CPT

## 2018-08-15 PROCEDURE — 25000003 PHARM REV CODE 250: Performed by: STUDENT IN AN ORGANIZED HEALTH CARE EDUCATION/TRAINING PROGRAM

## 2018-08-15 PROCEDURE — 63600175 PHARM REV CODE 636 W HCPCS: Performed by: STUDENT IN AN ORGANIZED HEALTH CARE EDUCATION/TRAINING PROGRAM

## 2018-08-15 PROCEDURE — 85025 COMPLETE CBC W/AUTO DIFF WBC: CPT

## 2018-08-15 PROCEDURE — 85610 PROTHROMBIN TIME: CPT

## 2018-08-15 PROCEDURE — 97162 PT EVAL MOD COMPLEX 30 MIN: CPT

## 2018-08-15 PROCEDURE — 97166 OT EVAL MOD COMPLEX 45 MIN: CPT

## 2018-08-15 PROCEDURE — 63600175 PHARM REV CODE 636 W HCPCS: Performed by: PHYSICIAN ASSISTANT

## 2018-08-15 PROCEDURE — 99239 HOSP IP/OBS DSCHRG MGMT >30: CPT | Mod: ,,, | Performed by: PHYSICIAN ASSISTANT

## 2018-08-15 PROCEDURE — 77387 GUIDANCE FOR RADJ TX DLVR: CPT | Mod: ,,, | Performed by: RADIOLOGY

## 2018-08-15 PROCEDURE — 77412 RADIATION TX DELIVERY LVL 3: CPT | Performed by: RADIOLOGY

## 2018-08-15 PROCEDURE — 25000003 PHARM REV CODE 250: Performed by: PHYSICIAN ASSISTANT

## 2018-08-15 RX ORDER — WARFARIN SODIUM 5 MG/1
5 TABLET ORAL ONCE
Qty: 1 TABLET | Refills: 0
Start: 2018-08-15 | End: 2018-08-15

## 2018-08-15 RX ORDER — ENOXAPARIN SODIUM 100 MG/ML
90 INJECTION SUBCUTANEOUS EVERY 12 HOURS
Start: 2018-08-15

## 2018-08-15 RX ORDER — OXYCODONE AND ACETAMINOPHEN 10; 325 MG/1; MG/1
1 TABLET ORAL EVERY 4 HOURS PRN
Refills: 0
Start: 2018-08-15

## 2018-08-15 RX ORDER — WARFARIN SODIUM 5 MG/1
5 TABLET ORAL ONCE
Status: COMPLETED | OUTPATIENT
Start: 2018-08-15 | End: 2018-08-15

## 2018-08-15 RX ORDER — AMOXICILLIN 250 MG
2 CAPSULE ORAL 2 TIMES DAILY
Start: 2018-08-15

## 2018-08-15 RX ORDER — ACETAMINOPHEN 325 MG/1
650 TABLET ORAL EVERY 6 HOURS PRN
Refills: 0
Start: 2018-08-15

## 2018-08-15 RX ORDER — SIMETHICONE 80 MG
80 TABLET,CHEWABLE ORAL 3 TIMES DAILY PRN
Refills: 0
Start: 2018-08-15

## 2018-08-15 RX ORDER — GABAPENTIN 300 MG/1
300 CAPSULE ORAL 3 TIMES DAILY
Qty: 90 CAPSULE | Refills: 11
Start: 2018-08-15 | End: 2019-08-15

## 2018-08-15 RX ORDER — DEXAMETHASONE 2 MG/1
TABLET ORAL
Qty: 20 TABLET | Refills: 0
Start: 2018-08-15

## 2018-08-15 RX ORDER — BISACODYL 10 MG
10 SUPPOSITORY, RECTAL RECTAL DAILY PRN
Refills: 0
Start: 2018-08-15

## 2018-08-15 RX ORDER — PANTOPRAZOLE SODIUM 40 MG/1
40 TABLET, DELAYED RELEASE ORAL DAILY
Qty: 30 TABLET | Refills: 11
Start: 2018-08-16 | End: 2019-08-16

## 2018-08-15 RX ORDER — ASPIRIN 81 MG/1
81 TABLET ORAL DAILY
Refills: 0
Start: 2018-08-16 | End: 2019-08-16

## 2018-08-15 RX ORDER — POLYETHYLENE GLYCOL 3350 17 G/17G
17 POWDER, FOR SOLUTION ORAL DAILY
Refills: 0
Start: 2018-08-16

## 2018-08-15 RX ADMIN — POLYETHYLENE GLYCOL 3350 17 G: 17 POWDER, FOR SOLUTION ORAL at 08:08

## 2018-08-15 RX ADMIN — Medication 0.5 MG: at 08:08

## 2018-08-15 RX ADMIN — DEXAMETHASONE SODIUM PHOSPHATE 4 MG: 4 INJECTION, SOLUTION INTRAMUSCULAR; INTRAVENOUS at 05:08

## 2018-08-15 RX ADMIN — OXYCODONE HYDROCHLORIDE AND ACETAMINOPHEN 1 TABLET: 10; 325 TABLET ORAL at 01:08

## 2018-08-15 RX ADMIN — OXYCODONE HYDROCHLORIDE AND ACETAMINOPHEN 1 TABLET: 10; 325 TABLET ORAL at 04:08

## 2018-08-15 RX ADMIN — ENOXAPARIN SODIUM 90 MG: 100 INJECTION SUBCUTANEOUS at 10:08

## 2018-08-15 RX ADMIN — OXYCODONE HYDROCHLORIDE AND ACETAMINOPHEN 1 TABLET: 10; 325 TABLET ORAL at 05:08

## 2018-08-15 RX ADMIN — OXYCODONE HYDROCHLORIDE AND ACETAMINOPHEN 1 TABLET: 10; 325 TABLET ORAL at 09:08

## 2018-08-15 RX ADMIN — Medication 0.5 MG: at 05:08

## 2018-08-15 RX ADMIN — DEXAMETHASONE SODIUM PHOSPHATE 4 MG: 4 INJECTION, SOLUTION INTRAMUSCULAR; INTRAVENOUS at 11:08

## 2018-08-15 RX ADMIN — GABAPENTIN 300 MG: 300 CAPSULE ORAL at 03:08

## 2018-08-15 RX ADMIN — Medication 0.5 MG: at 04:08

## 2018-08-15 RX ADMIN — ASPIRIN 81 MG: 81 TABLET, COATED ORAL at 08:08

## 2018-08-15 RX ADMIN — Medication 0.5 MG: at 11:08

## 2018-08-15 RX ADMIN — Medication 0.5 MG: at 02:08

## 2018-08-15 RX ADMIN — GABAPENTIN 300 MG: 300 CAPSULE ORAL at 08:08

## 2018-08-15 RX ADMIN — DOCUSATE SODIUM -SENNOSIDES 2 TABLET: 50; 8.6 TABLET, COATED ORAL at 08:08

## 2018-08-15 RX ADMIN — WARFARIN SODIUM 5 MG: 5 TABLET ORAL at 04:08

## 2018-08-15 RX ADMIN — PANTOPRAZOLE SODIUM 40 MG: 40 TABLET, DELAYED RELEASE ORAL at 08:08

## 2018-08-15 NOTE — PLAN OF CARE
IVÁN following for DC needs. IVÁN in communication with CM.    IVÁN spoke to Tereza at the [facility] who confirmed that the transfer center will set up transportation.     Violette Adair LMSW  [facility]  [id number]

## 2018-08-15 NOTE — PLAN OF CARE
Problem: Physical Therapy Goal  Goal: Physical Therapy Goal  PT goals until 8/21/18    1. Pt supine to sit with mod independent-not met  2. Pt sit to supine with mod independent-not met  3. Pt sit to stand with RW with SBA-not met  4. Pt to perform gait 100ft with RW with SBA.-not met  5. Pt to transfer bed to/from bedside chair with CGA.-not met  6. Pt to up/down 4 steps with R UE rail with minimal assist.-not met  7. Pt to perform B LE exs in sitting or supine x 20 reps to strengthen B LE to improve functional mobility.-not met     Outcome: Ongoing (interventions implemented as appropriate)  Pt's goals set and pt will benefit from skilled PT services to work towards improved functional mobility including: bed mobility, transfers, up/down steps, and gait.   Sueztte Anaya, PT  8/15/2018

## 2018-08-15 NOTE — SUBJECTIVE & OBJECTIVE
Interval History: Patient complains of some left thoracic pain, controlled.  Feels his weakness is improved.  No new weakness, paresthesias, or B/B dysfunction.   Plan for transfer back to [place] today.    Medications:  Continuous Infusions:   sodium chloride 0.9% 100 mL/hr at 08/14/18 1041     Scheduled Meds:   aspirin  81 mg Oral Daily    dexamethasone  4 mg Intravenous Q6H    enoxaparin  90 mg Subcutaneous Q12H    gabapentin  300 mg Oral TID    pantoprazole  40 mg Oral Daily    polyethylene glycol  17 g Oral Daily    senna-docusate 8.6-50 mg  2 tablet Oral BID    tamsulosin  0.4 mg Oral Nightly    warfarin  5 mg Oral Once     PRN Meds:acetaminophen, bisacodyl, ceFAZolin (ANCEF) IVPB, hydrALAZINE, HYDROmorphone, ondansetron, oxyCODONE-acetaminophen, simethicone     Review of Systems  Objective:     Weight: 87.8 kg (193 lb 9.6 oz)  Body mass index is 27 kg/m².  Vital Signs (Most Recent):  Temp: 97.9 °F (36.6 °C) (08/15/18 1130)  Pulse: 71 (08/15/18 1130)  Resp: 16 (08/15/18 1130)  BP: (!) 132/96 (08/15/18 1130)  SpO2: (!) 93 % (08/15/18 1130) Vital Signs (24h Range):  Temp:  [96.3 °F (35.7 °C)-98.6 °F (37 °C)] 97.9 °F (36.6 °C)  Pulse:  [52-74] 71  Resp:  [16-20] 16  SpO2:  [93 %-96 %] 93 %  BP: (113-163)/(72-96) 132/96          Neurosurgery Physical Exam   General: well developed, well nourished, no distress  Head: normocephalic, atraumatic  Neurologic: Alert and oriented. Thought content appropriate  GCS: Motor: 6/Verbal: 5/Eyes: 4 GCS Total: 15  Mental Status: Awake, Alert, Oriented x 4  Language: No aphasia  Speech: No dysarthria  Cranial nerves: face symmetric, tongue midline, CN II-XII grossly intact.   Eyes: pupils equal, round, reactive to light with accommodation, EOMI  Pulmonary: normal respirations, not labored, no accessory muscles used  Abdomen: soft, non-distended, not tender to palpation  Sensory: intact to light touch throughout  Motor Strength: Moves all extremities spontaneously with  good tone.  Full strength upper and lower extremities. No abnormal movements seen.     Strength  Deltoids Triceps Biceps Wrist Extension Wrist Flexion Hand    Upper: R 5/5 5/5 5/5 5/5 5/5 5/5    L 5/5 5/5 5/5 5/5 5/5 5/5     Iliopsoas Quadriceps Knee  Flexion Tibialis  anterior Gastro- cnemius EHL   Lower: R 4/5 4+/5 4+/5 5/5 5/5 5/5    L 4+/5 4+/5 4+/5 5/5 5/5 5/5     Pronator Drift: no drift noted  Finger-to-nose: Intact bilaterally  Sargent: absent  Clonus: absent  Babinski: absent  Pulses: 2+ and symmetric radial and dorsalis pedis  Skin: warm, dry and intact, no rashes        Significant Labs:  Recent Labs   Lab  08/14/18   0356  08/15/18   0506   GLU  92  90   NA  138  137   K  4.1  3.8   CL  102  104   CO2  25  23   BUN  23  24*   CREATININE  0.8  0.8   CALCIUM  8.8  8.6*     Recent Labs   Lab  08/14/18   0356  08/15/18   0506   WBC  7.80  7.98   HGB  12.5*  12.0*   HCT  37.9*  36.4*   PLT  316  318     Recent Labs   Lab  08/14/18   0823  08/15/18   0506   INR  1.1  1.3*     Microbiology Results (last 7 days)     ** No results found for the last 168 hours. **          No new imaging

## 2018-08-15 NOTE — PROGRESS NOTES
[facility] Hospital Medicine  Progress Note    Patient Name: Edward Barber  MRN: [id number]  Patient Class: IP- Inpatient   Admission Date: 8/9/2018  Length of Stay: 6 days  Attending Physician: Zach Patel MD  Primary Care Provider: Primary Doctor [facility] Medicine Team: Networked reference to record PCT  Gurpreet Sal MD    Subjective:     Principal Problem:Mass of thoracic vertebra    HPI:  Mr. 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 Complains of 8/10 mid back pain that radiates to his chest. States symptoms have improved with administration of steroids. He denies urinary incontinence or difficulty urinating. Denies bowel incontinence, complains of constipation and decreased appetite.Scheduled to have surgery 8/13.    Hospital medicine was consulted for pre op clearance, possible pulmonary emboli seen on CT.    Hospital Course:  CTA revealed bilateral segmental PEs.  Surgery scheduled for 8/13 has been deferred in favor of radiation therapy.  Patient received 3 of 10 fractions of radiation therapy as of 8/15.  MAG3 scan shows no function of R kidney; no urological intervention planned.  Planned to transfer to [place] today for remainder of radiation therapy.    Interval History: No events overnight. Patient's pain and weakness improving.    Review of Systems  Objective:     Vital Signs (Most Recent):  Temp: 98.6 °F (37 °C) (08/15/18 0800)  Pulse: 69 (08/15/18 0800)  Resp: 20 (08/15/18 0800)  BP: (!) 147/72 (08/15/18 0800)  SpO2: 96 % (08/15/18 0800) Vital Signs (24h Range):  Temp:  [96.3 °F (35.7 °C)-98.6 °F (37 °C)] 98.6 °F (37 °C)  Pulse:  [52-83] 69  Resp:  [18-20] 20  SpO2:  [93 %-98 %] 96 %  BP: (113-163)/(72-85) 147/72      Weight: 87.8 kg (193 lb 9.6 oz)  Body mass index is 27 kg/m².    Intake/Output Summary (Last 24 hours) at 8/15/2018 1049  Last data filed at 8/15/2018 0600  Gross per 24 hour   Intake 230 ml   Output 625 ml   Net -395 ml      Physical Exam    Significant Labs:   CBC:   Recent Labs   Lab  08/14/18   0356  08/15/18   0506   WBC  7.80  7.98   HGB  12.5*  12.0*   HCT  37.9*  36.4*   PLT  316  318     CMP:   Recent Labs   Lab  08/14/18   0356  08/15/18   0506   NA  138  137   K  4.1  3.8   CL  102  104   CO2  25  23   GLU  92  90   BUN  23  24*   CREATININE  0.8  0.8   CALCIUM  8.8  8.6*   ANIONGAP  11  10   EGFRNONAA  >60.0  >60.0     Coagulation:   Recent Labs   Lab  08/15/18   0506   INR  1.3*       Significant Imaging: I have reviewed all pertinent imaging results/findings within the past 24 hours.    Assessment/Plan:      * Mass of thoracic vertebra    Likely 2/2 to metastatic prostate cancer.  - Surgery postponed in favor of radiation therapy  - Patient received 3rd of 10 fractions of radiation therapy 8/15 AM.  - Continue with current medication regimen,  dexamethasone 6q6, gabapentin 300 tid.  - Manage as per neurosurgery and radiation oncology  - Patient can transfer back to [place] for remainer of radiation therapy        Pulmonary emboli    Bilateral segmental PEs of the lower lobes and lingula. No evidence of DVT on US. Pt is uninsured and cannot afford long-term Lovenox or NoAC as outpatient.  - Currently bridging to warfarin with Lovenox  - Received 5mg warfarin in PM on 8/13 and 8/14  - INR this AM is 1.3  - Ordered 5 mg warfarin for PM of 8/15, but patient may be transferred to [place] by then  - Continue Lovenox until warfarin is therapeutic  - Continue warfarin with goal INR 2-3        Thoracic ascending aortic aneurysm    5 cm thoracic aortic aneurysm seen on CT 8/10, No signs of rupture  No prior chest imaging to compare  Pt requires follow up and monitoring outpatient        Hydronephrosis     Obstructive mass around bladder causing obstruction of R ureter and hydronephrosis. MAG 3 scan shows R kidney is nonfunctional  - No intervention per urology  - L kidney with moderate impairment  - Overall kidney function wnl        Bilateral leg weakness    Likely 2/2 metastatic tumor from existing prostate cancer  - Continue PT/OT  - Continue with current medication regimen  - Neurosurgery following  - Symptoms improving        Prostate cancer metastatic to bone    Currently on chemo and hormonal therapy  Appreciate oncology recs        Coronary artery disease involving native coronary artery of native heart without angina pectoris    CAD s/p stent placement 6yrs ago  - On ASA and Plavix at home  - Restarted aspirin 81 mg  - No indication to restart Plavix as stent was placed 6 years ago  - Last saw cardiologist 4 months ago for evaluation  - 2 D echo from 2017 showed normal EF and no significant valve abnormalities.   - No current signs or symptoms of CHF  - Follow up as outpatient          VTE Risk Mitigation (From admission, onward)        Ordered     warfarin (COUMADIN) tablet 5 mg  Once      08/15/18 1048     enoxaparin injection 90 mg  Every 12 hours      08/13/18 1207     Place sequential compression device  Until discontinued      08/09/18 7235              Gurpreet Sal MD  Department of Hospital Medicine   [facility]

## 2018-08-15 NOTE — DISCHARGE SUMMARY
[facility]  Neurosurgery  Discharge Summary      Patient Name: Edward Barber  MRN: [id number]  Admission Date: 8/9/2018  Hospital Length of Stay: 6 days  Discharge Date and Time: 8/15/2018  5:45 PM  Attending Physician: Zach Patel MD   Discharging Provider: Angela Villanueva PA-C  Primary Care Provider: Primary Doctor No    HPI:   61 yo male with pmh of cad s/p stenting on asa 325 qd and clopidogrel 75 qd and known prostate CA undergoing active oral chemotherapy presents as direct transfer with four day history of progressive bilateral proximal leg weakness. Pt did not take anti-platelet regimen today.     On exam pt has paraparesis most severe in bilateral hip flexion and knee extension (4-/5). Pt also complains of radiculopathic thoracic pain in T4 and T5 dermatomes. There is a decreased but preserved sensation to light touch below T4. One beat of clonus on right. No watkins bilaterally. Normoreflexic bilaterally. Perineal sensation present and no complaints of bowel or bladder symptoms. Rectal exam deferred.     Outside constrasted neuroaxis imaging shows osteoblastic metastasis with three column involvement and epidural extension with canal obliteration at T4 and T5. Epidural extension also extends ventrally to T3. No acute fractures. Pt also with cervical spondylytic disease with canal stenosis and signal change at C4-C5. 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           Procedure(s) (LRB):  LAMINECTOMY, SPINE, THORACIC, WITH FUSION T2-6 laminectomy and fusion (N/A)     Hospital Course: 8/9: admitted to [facility]   8/10: med consulted for surgical clearance. CT CAP ordered. CT T spine ordered. Rad onc consulted.   8/11: Medicine recommended BLE to r/o DVT. If negative, obtain CTA PE protocol but must wait 24 hours from previous contrast.   8/12: for CTA chest today, stood and walked with PT yesterday  8/13:  Pt denies any worsening thoracic pain when standing as opposed to laying down.  8/14: Pain controlled. Received 2/10 XRT today. Right kidney non functional. No intervention per Urology. Pending return to [place] vs transfer to [facility].   8/15: Pain controlled.  Received 3/10 XRT today.  Bridging from lovenox to coumadin.  Tx back to [facility] today.     Consults:   Consults (From admission, onward)        Status Ordering Provider     Inpatient consult to Hospital Medicine-General  Once     Provider:  (Not yet assigned)    Completed LAMAR SHANKAR     Inpatient consult to Radiation Oncology  Once     Provider:  (Not yet assigned)    Completed KAVITHA RUBIO     Inpatient consult to Urology  Once     Provider:  (Not yet assigned)    Completed AURE COLVIN          Significant Diagnostic Studies: CT Tspine - Pathologic compression fracture of the T4 vertebral body with associated abnormal adjacent soft tissue attenuation which appears to involve the epidural space at the T3-T4 and T4-T5 levels noting definitive evaluation is limited due to noncontrast technique.  MRI would provide more sensitive assessment of these findings.    Pending Diagnostic Studies:     Procedure Component Value Units Date/Time    SURG Fl Surgery FLuoro Greater Than 1 Hour [[id number]]     Order Status:  Sent Lab Status:  No result         Final Active Diagnoses:    Diagnosis Date Noted POA    PRINCIPAL PROBLEM:  Mass of thoracic vertebra [R22.2] 08/09/2018 Yes    Abnormal CT scan, chest [R93.8]  Unknown    Hydroureteronephrosis [N13.30]  Unknown    Hydronephrosis [N13.30] 08/11/2018 Yes    Thoracic ascending aortic aneurysm [I71.2] 08/11/2018 Yes    Pulmonary emboli [I26.99] 08/11/2018 Yes    Coronary artery disease involving native coronary artery of native heart without angina pectoris [I25.10] 08/10/2018 Yes    Prostate cancer metastatic to bone [C61, C79.51] 08/10/2018 Yes    Thoracic  myelopathy [M47.14] 08/10/2018 Yes    Bilateral leg weakness [R29.898] 08/10/2018 Yes      Problems Resolved During this Admission:    Diagnosis Date Noted Date Resolved POA    Pre-operative clearance [Z01.818] 08/10/2018 08/13/2018 Not Applicable      Discharged Condition: good    Disposition: Transfer back to [facility]     Follow Up: Follow ups to be determined per [facility]     Patient Instructions:   No discharge procedures on file.  Medications:  Reconciled Home Medications:      Medication List      START taking these medications    acetaminophen 325 MG tablet  Commonly known as:  TYLENOL  Take 2 tablets (650 mg total) by mouth every 6 (six) hours as needed.     aspirin 81 MG EC tablet  Commonly known as:  ECOTRIN  Take 1 tablet (81 mg total) by mouth once daily.  Replaces:  aspirin 325 MG tablet     bisacodyl 10 mg Supp  Commonly known as:  DULCOLAX  Place 1 suppository (10 mg total) rectally daily as needed.     dexamethasone 2 MG tablet  Commonly known as:  DECADRON  Take 2 tabs TID x 4d, then 1 tab QID x 4d, then 1 tab TID x4d, then 1 tab BID to be continued     enoxaparin 100 mg/mL Syrg  Commonly known as:  LOVENOX  Inject 0.9 mLs (90 mg total) into the skin every 12 (twelve) hours.     gabapentin 300 MG capsule  Commonly known as:  NEURONTIN  Take 1 capsule (300 mg total) by mouth 3 (three) times daily.     oxyCODONE-acetaminophen  mg per tablet  Commonly known as:  PERCOCET  Take 1 tablet by mouth every 4 (four) hours as needed.     pantoprazole 40 MG tablet  Commonly known as:  PROTONIX  Take 1 tablet (40 mg total) by mouth once daily.     polyethylene glycol 17 gram Pwpk  Commonly known as:  GLYCOLAX  Take 17 g by mouth once daily.     senna-docusate 8.6-50 mg 8.6-50 mg per tablet  Commonly known as:  PERICOLACE  Take 2 tablets by mouth 2 (two) times daily.     simethicone 80 MG chewable tablet  Commonly known as:  MYLICON  Take 1 tablet (80 mg total) by mouth 3 (three)  times daily as needed for Flatulence.     warfarin 5 MG tablet  Commonly known as:  COUMADIN  Take 1 tablet (5 mg total) by mouth once. for 1 dose        CONTINUE taking these medications    FLOMAX ORAL  Take 0.4 mg by mouth nightly.        STOP taking these medications    aspirin 325 MG tablet  Replaced by:  aspirin 81 MG EC tablet     clopidogrel 75 mg tablet  Commonly known as:  PLAVIX     enzalutamide 40 mg Cap  Commonly known as:  XTANDI     HYDROcodone-acetaminophen  mg per tablet  Commonly known as:  NORCO     methadone 10 MG tablet  Commonly known as:  DOLOPHINE            Angela Villanueva PA-C  Neurosurgery  [facility]

## 2018-08-15 NOTE — PT/OT/SLP EVAL
"Physical Therapy Evaluation    Patient Name:  Edward Barber   MRN:  [id number]    Recommendations:     Discharge Recommendations:  home health PT(with family assist)   Discharge Equipment Recommendations: 3-in-1 commode, walker, rolling   Barriers to discharge: Inaccessible home and Decreased caregiver support 4 KAYLIN; pt lives alone but states he will be going to stay with his son upon D/C    Assessment:     Edward Barber is a 62 y.o. male admitted with a medical diagnosis of Mass of thoracic vertebra.  He presents with the following impairments/functional limitations:  weakness, impaired functional mobilty, gait instability, pain, decreased safety awareness, impaired balance. Pt is motivated to progress with mobility.    Rehab Prognosis:  good; patient would benefit from acute skilled PT services to address these deficits and reach maximum level of function.      Recent Surgery: none    Plan:     During this hospitalization, patient to be seen 4 x/week to address the above listed problems via gait training, therapeutic activities, therapeutic exercises, neuromuscular re-education  · Plan of Care Expires:  09/14/18   Plan of Care Reviewed with: patient    Subjective     Communicated with nurse  prior to session.  Patient found supine upon PT entry to room, agreeable to evaluation.    "My legs are working better"  Pain/Comfort:  · Pain Rating 1: 8/10  · Location - Orientation 1: lower  · Location 1: back  · Pain Addressed 1: Pre-medicate for activity, Reposition  · Pain Rating Post-Intervention 1: 8/10    Patients cultural, spiritual, Tenriism conflicts given the current situation: none noted    Living Environment:  Pt lives alone in a mobile home with 4 KAYLIN with R UE rail. Pt states he will be going to stay with his son upon D/C who also has steps.  Prior to admission, patients level of function was required assist for mobility/gait.  Patient has the following equipment: crutches, axillary.  upon discharge, patient " will have assistance from son.    Objective:     Patient found with: SCD, peripheral IV     General Precautions: Standard, fall   Orthopedic Precautions:N/A   Braces: N/A     Exams:  · Cognitive Exam:  Patient is oriented to Person, Place, Time and Situation  · Postural Exam:  Patient presented with the following abnormalities:    · -       Rounded shoulders  · -       Forward head  · Sensation:    · -       Intact  light/touch B LE; testing unreliable due to pt opened his eyes at times during test  · RLE ROM: WFL  · RLE Strength: Deficits: hip flex 3+/5; knee flex/ext 4-/5; ankle DF 4-/5  · LLE ROM: WFL  · LLE Strength: Deficits: hip flex 3+/5; knee flex/ext 4-/5; ankle DF 4-/5    Functional Mobility:  · Bed Mobility:     · Rolling Left:  stand by assistance  · Supine to Sit: stand by assistance  · Transfers:     · Sit to Stand:  minimum assistance with hand-held assist  · Gait: 10ft with no AD with minimal/moderate assist; pt required minimal assist on straight path but had anterior instability when turning to sit on w/c requiring moderate assist    AM-PAC 6 CLICK MOBILITY  Total Score:16     Therapeutic Activities and Exercises:   Pt sat on the EOB ~ 8 min with SBA prior to transfer    Patient left up in chair with all lines intact and transporter and nurse present.    GOALS:   Multidisciplinary Problems     Physical Therapy Goals        Problem: Physical Therapy Goal    Goal Priority Disciplines Outcome Goal Variances Interventions   Physical Therapy Goal     PT, PT/OT      Description:  PT goals until 8/21/18    1. Pt supine to sit with mod independent-not met  2. Pt sit to supine with mod independent-not met  3. Pt sit to stand with RW with SBA-not met  4. Pt to perform gait 100ft with RW with SBA.-not met  5. Pt to transfer bed to/from bedside chair with CGA.-not met  6. Pt to up/down 4 steps with R UE rail with minimal assist.-not met  7. Pt to perform B LE exs in sitting or supine x 20 reps to strengthen B  LE to improve functional mobility.-not met                      History:     Past Medical History:   Diagnosis Date    Chronic pain of left knee     Coronary artery disease involving native coronary artery of native heart without angina pectoris 8/10/2018    Osteoarthritis of knees, bilateral     Prostate cancer metastatic to bone 8/10/2018       Past Surgical History:   Procedure Laterality Date    CORONARY ANGIOPLASTY WITH STENT PLACEMENT  2012    JOINT REPLACEMENT      TONSILLECTOMY         Clinical Decision Making:     History  Co-morbidities and personal factors that may impact the plan of care Examination  Body Structures and Functions, activity limitations and participation restrictions that may impact the plan of care Clinical Presentation   Decision Making/ Complexity Score   Co-morbidities:   [] Time since onset of injury / illness / exacerbation  [x] Status of current condition  []Patient's cognitive status and safety concerns    [] Multiple Medical Problems (see med hx)  Personal Factors:   [] Patient's age  [x] Prior Level of function   [x] Patient's home situation (environment and family support)  [] Patient's level of motivation  [] Expected progression of patient      HISTORY:(criteria)    [] 54389 - no personal factors/history    [] 22423 - has 1-2 personal factor/comorbidity     [x] 36189 - has >3 personal factor/comorbidity     Body Regions:  [] Objective examination findings  [] Head     []  Neck  [] Trunk   [] Upper Extremity  [] Lower Extremity    Body Systems:  [] For communication ability, affect, cognition, language, and learning style: the assessment of the ability to make needs known, consciousness, orientation (person, place, and time), expected emotional /behavioral responses, and learning preferences (eg, learning barriers, education  needs)  [x] For the neuromuscular system: a general assessment of gross coordinated movement (eg, balance, gait, locomotion, transfers, and  transitions) and motor function  (motor control and motor learning)  [x] For the musculoskeletal system: the assessment of gross symmetry, gross range of motion, gross strength, height, and weight  [] For the integumentary system: the assessment of pliability(texture), presence of scar formation, skin color, and skin integrity  [] For cardiovascular/pulmonary system: the assessment of heart rate, respiratory rate, blood pressure, and edema     Activity limitations:    [] Patient's cognitive status and saf ety concerns          [x] Status of current condition      [] Weight bearing restriction  [] Cardiopulmunary Restriction    Participation Restrictions:   [] Goals and goal agreement with the patient     [] Rehab potential (prognosis) and probable outcome      Examination of Body System: (criteria)    [] 67802 - addressing 1-2 elements    [x] 33757 - addressing a total of 3 or more elements     [] 82740 -  Addressing a total of 4 or more elements         Clinical Presentation: (criteria)  Evolving - 07119     On examination of body system using standardized tests and measures patient presents with (CHOOSE ONE) elements from any of the following: body structures and functions, activity limitations, and/or participation restrictions.  Leading to a clinical presentation that is considered (CHOOSE ONE)                              Clinical Decision Making  (Eval Complexity):  Moderate - 53938     Time Tracking:     PT Received On: 08/15/18  PT Start Time: 0817     PT Stop Time: 0834  PT Total Time (min): 17 min     Billable Minutes: Evaluation 17      Suzette Anaya, PT  08/15/2018

## 2018-08-15 NOTE — ASSESSMENT & PLAN NOTE
Likely 2/2 metastatic tumor from existing prostate cancer  - Continue PT/OT  - Continue with current medication regimen  - Neurosurgery following  - Symptoms improving

## 2018-08-15 NOTE — SUBJECTIVE & OBJECTIVE
Interval History: No events overnight. Patient's pain and weakness improving.    Review of Systems  Objective:     Vital Signs (Most Recent):  Temp: 98.6 °F (37 °C) (08/15/18 0800)  Pulse: 69 (08/15/18 0800)  Resp: 20 (08/15/18 0800)  BP: (!) 147/72 (08/15/18 0800)  SpO2: 96 % (08/15/18 0800) Vital Signs (24h Range):  Temp:  [96.3 °F (35.7 °C)-98.6 °F (37 °C)] 98.6 °F (37 °C)  Pulse:  [52-83] 69  Resp:  [18-20] 20  SpO2:  [93 %-98 %] 96 %  BP: (113-163)/(72-85) 147/72     Weight: 87.8 kg (193 lb 9.6 oz)  Body mass index is 27 kg/m².    Intake/Output Summary (Last 24 hours) at 8/15/2018 1049  Last data filed at 8/15/2018 0600  Gross per 24 hour   Intake 230 ml   Output 625 ml   Net -395 ml      Physical Exam    Significant Labs:   CBC:   Recent Labs   Lab  08/14/18   0356  08/15/18   0506   WBC  7.80  7.98   HGB  12.5*  12.0*   HCT  37.9*  36.4*   PLT  316  318     CMP:   Recent Labs   Lab  08/14/18   0356  08/15/18   0506   NA  138  137   K  4.1  3.8   CL  102  104   CO2  25  23   GLU  92  90   BUN  23  24*   CREATININE  0.8  0.8   CALCIUM  8.8  8.6*   ANIONGAP  11  10   EGFRNONAA  >60.0  >60.0     Coagulation:   Recent Labs   Lab  08/15/18   0506   INR  1.3*       Significant Imaging: I have reviewed all pertinent imaging results/findings within the past 24 hours.

## 2018-08-15 NOTE — ASSESSMENT & PLAN NOTE
Likely 2/2 to metastatic prostate cancer.  - Surgery postponed in favor of radiation therapy  - Patient received 3rd of 10 fractions of radiation therapy 8/15 AM.  - Continue with current medication regimen,  dexamethasone 6q6, gabapentin 300 tid.  - Manage as per neurosurgery and radiation oncology  - Patient can transfer back to [place] for remainer of radiation therapy

## 2018-08-15 NOTE — ASSESSMENT & PLAN NOTE
Bilateral segmental PEs of the lower lobes and lingula. No evidence of DVT on US. Pt is uninsured and cannot afford long-term Lovenox or NoAC as outpatient.  - Currently bridging to warfarin with Lovenox  - Received 5mg warfarin in PM on 8/13 and 8/14  - INR this AM is 1.3  - Ordered 5 mg warfarin for PM of 8/15, but patient may be transferred to [place] by then  - Continue Lovenox until warfarin is therapeutic  - Continue warfarin with goal INR 2-3

## 2018-08-15 NOTE — ASSESSMENT & PLAN NOTE
62M with known metastatic prostate CA who presents with worsening thoracic radiculomyelopathy and a newly discovered T4 metastatic tumor with cord compression.   -Patient neurologically stable on exam  -Thoracic mass: CT thoracic spine shows no signs of overt instability. No neurosurgical intervention recommended due to multiple medical co morbidities. Completed 3/10 XRT today. Start dex taper to 2mg BID tomorrow. Protonix for PPI.   -Pulmonary Emboli: Bilateral segmental PEs of the lower lobes and lingula. No evidence of DVT on US. On Lovenox with bridge to warfarin, goal INR 2-3. INR today 1.3.  Note in transfer orders for 5mg Coumadin to be given tonight.  Appreciate [facility] medicine recs.  -Thoracic ascending aortic aneurysm: 5 cm thoracic aortic aneurysm seen on CT 8/10. No signs of rupture. Requires follow up and monitoring outpatient. Continue management per Medicine.   -Hydronephrosis: Obstructive mass around bladder causing obstruction of R ureter and hydronephrosis. MAG 3 scan shows R kidney is nonfunctional. No intervention recommended. Overall kidney function WNL. Appreciate Urology recs.   -Urinary retention: Continue home dose of Flomax daily and bladder scan PRN.   -Prostate cancer metastatic to bone: Currently on chemo and hormonal therapy. Continue management per outpatient Oncologist.   -Continue daily bowel regimen.   -Continue LINDA's, SCD's, and Lovenox for DVTP  -Transfer note written   -Plan for transfer back to [facility] today  -Follow ups to be made per [place]  -Follow up with Nsurg PRN  -Discussed with Dr. Patel

## 2018-08-15 NOTE — NURSING
Patient received dishcarge orders. Attempted to call report twice. Jeremias said they would call back. Patient left with Marcos at 1745. Patient left with 3 boxes full of a hard copy of his chart. IV removed.

## 2018-08-15 NOTE — PROGRESS NOTES
[facility]  Neurosurgery  Progress Note    Subjective:     History of Present Illness: 63 yo male with pmh of cad s/p stenting on asa 325 qd and clopidogrel 75 qd and known prostate CA undergoing active oral chemotherapy presents as direct transfer with four day history of progressive bilateral proximal leg weakness. Pt did not take anti-platelet regimen today.     On exam pt has paraparesis most severe in bilateral hip flexion and knee extension (4-/5). Pt also complains of radiculopathic thoracic pain in T4 and T5 dermatomes. There is a decreased but preserved sensation to light touch below T4. One beat of clonus on right. No watkins bilaterally. Normoreflexic bilaterally. Perineal sensation present and no complaints of bowel or bladder symptoms. Rectal exam deferred.     Outside constrasted neuroaxis imaging shows osteoblastic metastasis with three column involvement and epidural extension with canal obliteration at T4 and T5. Epidural extension also extends ventrally to T3. No acute fractures. Pt also with cervical spondylytic disease with canal stenosis and signal change at C4-C5. 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           Post-Op Info:  Procedure(s) (LRB):  LAMINECTOMY, SPINE, THORACIC, WITH FUSION T2-6 laminectomy and fusion (N/A)   2 Days Post-Op   Interval History: Patient complains of some left thoracic pain, controlled.  Feels his weakness is improved.  No new weakness, paresthesias, or B/B dysfunction.   Plan for transfer back to [place] today.    Medications:  Continuous Infusions:   sodium chloride 0.9% 100 mL/hr at 08/14/18 1041     Scheduled Meds:   aspirin  81 mg Oral Daily    dexamethasone  4 mg Intravenous Q6H    enoxaparin  90 mg Subcutaneous Q12H    gabapentin  300 mg Oral TID    pantoprazole  40 mg Oral Daily    polyethylene glycol  17 g Oral Daily    senna-docusate  8.6-50 mg  2 tablet Oral BID    tamsulosin  0.4 mg Oral Nightly    warfarin  5 mg Oral Once     PRN Meds:acetaminophen, bisacodyl, ceFAZolin (ANCEF) IVPB, hydrALAZINE, HYDROmorphone, ondansetron, oxyCODONE-acetaminophen, simethicone     Review of Systems  Objective:     Weight: 87.8 kg (193 lb 9.6 oz)  Body mass index is 27 kg/m².  Vital Signs (Most Recent):  Temp: 97.9 °F (36.6 °C) (08/15/18 1130)  Pulse: 71 (08/15/18 1130)  Resp: 16 (08/15/18 1130)  BP: (!) 132/96 (08/15/18 1130)  SpO2: (!) 93 % (08/15/18 1130) Vital Signs (24h Range):  Temp:  [96.3 °F (35.7 °C)-98.6 °F (37 °C)] 97.9 °F (36.6 °C)  Pulse:  [52-74] 71  Resp:  [16-20] 16  SpO2:  [93 %-96 %] 93 %  BP: (113-163)/(72-96) 132/96          Neurosurgery Physical Exam   General: well developed, well nourished, no distress  Head: normocephalic, atraumatic  Neurologic: Alert and oriented. Thought content appropriate  GCS: Motor: 6/Verbal: 5/Eyes: 4 GCS Total: 15  Mental Status: Awake, Alert, Oriented x 4  Language: No aphasia  Speech: No dysarthria  Cranial nerves: face symmetric, tongue midline, CN II-XII grossly intact.   Eyes: pupils equal, round, reactive to light with accommodation, EOMI  Pulmonary: normal respirations, not labored, no accessory muscles used  Abdomen: soft, non-distended, not tender to palpation  Sensory: intact to light touch throughout  Motor Strength: Moves all extremities spontaneously with good tone.  Full strength upper and lower extremities. No abnormal movements seen.     Strength  Deltoids Triceps Biceps Wrist Extension Wrist Flexion Hand    Upper: R 5/5 5/5 5/5 5/5 5/5 5/5    L 5/5 5/5 5/5 5/5 5/5 5/5     Iliopsoas Quadriceps Knee  Flexion Tibialis  anterior Gastro- cnemius EHL   Lower: R 4/5 4+/5 4+/5 5/5 5/5 5/5    L 4+/5 4+/5 4+/5 5/5 5/5 5/5     Pronator Drift: no drift noted  Finger-to-nose: Intact bilaterally  Sargent: absent  Clonus: absent  Babinski: absent  Pulses: 2+ and symmetric radial and dorsalis pedis  Skin:  warm, dry and intact, no rashes        Significant Labs:  Recent Labs   Lab  08/14/18   0356  08/15/18   0506   GLU  92  90   NA  138  137   K  4.1  3.8   CL  102  104   CO2  25  23   BUN  23  24*   CREATININE  0.8  0.8   CALCIUM  8.8  8.6*     Recent Labs   Lab  08/14/18   0356  08/15/18   0506   WBC  7.80  7.98   HGB  12.5*  12.0*   HCT  37.9*  36.4*   PLT  316  318     Recent Labs   Lab  08/14/18   0823  08/15/18   0506   INR  1.1  1.3*     Microbiology Results (last 7 days)     ** No results found for the last 168 hours. **          No new imaging    Assessment/Plan:     * Mass of thoracic vertebra    62M with known metastatic prostate CA who presents with worsening thoracic radiculomyelopathy and a newly discovered T4 metastatic tumor with cord compression.   -Patient neurologically stable on exam  -Thoracic mass: CT thoracic spine shows no signs of overt instability. No neurosurgical intervention recommended due to multiple medical co morbidities. Completed 3/10 XRT today. Start dex taper to 2mg BID tomorrow. Protonix for PPI.   -Pulmonary Emboli: Bilateral segmental PEs of the lower lobes and lingula. No evidence of DVT on US. On Lovenox with bridge to warfarin, goal INR 2-3. INR today 1.3.  Note in transfer orders for 5mg Coumadin to be given tonight.  Appreciate [facility] medicine recs.  -Thoracic ascending aortic aneurysm: 5 cm thoracic aortic aneurysm seen on CT 8/10. No signs of rupture. Requires follow up and monitoring outpatient. Continue management per Medicine.   -Hydronephrosis: Obstructive mass around bladder causing obstruction of R ureter and hydronephrosis. MAG 3 scan shows R kidney is nonfunctional. No intervention recommended. Overall kidney function WNL. Appreciate Urology recs.   -Urinary retention: Continue home dose of Flomax daily and bladder scan PRN.   -Prostate cancer metastatic to bone: Currently on chemo and hormonal therapy. Continue management per outpatient Oncologist.   -Continue  daily bowel regimen.   -Continue LINDA's, SCD's, and Lovenox for DVTP  -Transfer note written   -Plan for transfer back to [facility] today  -Follow ups to be made per [place]  -Follow up with Nsurg PRN  -Discussed with Dr. Amanda Villanueva PATanmayC  Neurosurgery  [facility]

## 2018-08-15 NOTE — PT/OT/SLP EVAL
"Occupational Therapy   Evaluation    Name: Edward Barber  MRN: [id number]  Admitting Diagnosis:  Mass of thoracic vertebra     Recommendations:     Discharge Recommendations: home with home health(24 hour supervision)  Discharge Equipment Recommendations:  bedside commode, 3-in-1 commode, walker, rolling  Barriers to discharge:  None    History:     Occupational Profile:  Living Environment: Pt lives alone in [facility] with 4 KAYLIN. Tub/shower combo. He reported that upon d/c, he plans to live with his son in [place]. Home will also have KAYLIN.   Previous level of function: reported increase in LB weakness upon admit. Was still driving. Retired  and police force. Reported 1 fall in past 3 months.   Roles and Routines: father, care taker to self, home and community dweller,   Equipment Used at Home:  crutches, axillary  Assistance upon Discharge: yes, son    Past Medical History:   Diagnosis Date    Chronic pain of left knee     Coronary artery disease involving native coronary artery of native heart without angina pectoris 8/10/2018    Osteoarthritis of knees, bilateral     Prostate cancer metastatic to bone 8/10/2018       Past Surgical History:   Procedure Laterality Date    CORONARY ANGIOPLASTY WITH STENT PLACEMENT  2012    JOINT REPLACEMENT      TONSILLECTOMY         Subjective     Chief Complaint: pain in lumbar spine  Patient Comments: Pt reported "I have stage 4 cancer, I've lived a good life. I just want to make it to [place]"     Pain/Comfort:  · Pain Rating 1: 7/10  · Location 1: (lower back)  · Pain Addressed 1: Pre-medicate for activity, Nurse notified  · Pain Rating Post-Intervention 1: (remained)    Patients cultural, spiritual, Latter-day conflicts given the current situation: none reported    Objective:     Communicated with: RN prior to session.  Completed with PT.   Patient found supine and SCD, peripheral IV upon OT entry to room.     General Precautions: Standard, fall   Orthopedic " Precautions:N/A   Braces: N/A     Occupational Performance:    Bed Mobility:    · Patient completed Rolling/Turning to Left with  modified independence and with side rail  · Patient completed Supine to Sit with stand by assistance, with side rail and HOB elevated    Functional Mobility/Transfers:  · Patient completed Sit <> Stand Transfer with minimum assistance  with  no assistive device   · Patient completed Bed <> Chair Transfer using Step Transfer technique with maximal assistance with no assistive device  · Functional Mobility: short household distance with mod(A) 2/2 ataxic gait  · Max(A) for pivot and turn    Activities of Daily Living:  · Feeding:  mod(I) with set up    · Grooming: unable to complete in standing; set up and supervision seated EOB    · Upper Body Dressing: contact guard assistance EOB to don gown  · Lower Body Dressing: maximal assistance 4 point position for donning B non slip socks    Cognitive/Visual Perceptual:  Cognitive/Psychosocial Skills:     -       Oriented to: Person, Place, Time and Situation   -       Follows Commands/attention:Follows multistep  commands  -       Communication: clear/fluent  -       Memory: No Deficits noted  -       Safety awareness/insight to disability: intact   -       Mood/Affect/Coping skills/emotional control: Cooperative  Visual/Perceptual:      -Intact  -wears reading glasses      Physical Exam:  Balance:    -       Mod(A)-max(A) for dyanmic standing  Postural examination/scapula alignment:    -       Rounded shoulders  -       Forward head  -       Posterior pelvic tilt  Skin integrity: Visible skin intact  Edema:  None noted  Sensation:    -       Intact  B UE    Motor Planning:    -       Intact B UE; mildy ataxic for B LE  Dominant hand:    -       R  Upper Extremity Range of Motion:     -       Right Upper Extremity: WFL    -       Left Upper Extremity: WFL except shoulder flex 100*      Upper Extremity Strength:    -       Right Upper Extremity:  WFL  -       Left Upper Extremity: WFL     Strength:    -       Right Upper Extremity: WFL    -       Left Upper Extremity: WFL    Fine Motor Coordination:    -       Intact  Left hand, finger to nose, Right hand, finger to nose, Left hand thumb/finger opposition skills, Right hand thumb/finger opposition skills, Left hand, manipulation of objects and Right hand, manipulation of objects  Gross motor coordination:   WFL for B UE    AMPAC 6 Click ADL:  AMPAC Total Score: 19     Body mass index is 27 kg/m².    Vitals:    08/15/18 1130   BP: (!) 132/96   Pulse: 71   Resp: 16   Temp: 97.9 °F (36.6 °C)       Treatment & Education:  -Pt alert and agreeable to therapy session  -Edu on spinal precautions and ergonomics with need for reinforcement; edu on LB dressing modification with demo understanding  -Discussed safety while in acute setting and need for staff supervision and assistance for OOB with demo understanding   -Communication board updated; questions/concerns addressed within OT scope of practice  -No family at bedside for education   Education:    Patient left in WC with transport for raditation with all lines intact and RN notified    Assessment:     Edward Barber is a 62 y.o. male with a medical diagnosis of Mass of thoracic vertebra.  He presents with B LE weakness and impaired functional mobility affecting his functional indep and safety with daily tasks and activities. He demo good motivation and participation this session. He would best benefit from return home to natural environment for therapy services at this time. Pt with the following performance deficits affecting function: weakness, impaired endurance, impaired functional mobilty, gait instability, impaired balance, impaired self care skills, decreased lower extremity function, decreased coordination, impaired cardiopulmonary response to activity.      Rehab Prognosis: Good; patient would benefit from acute skilled OT services to address these  "deficits and reach maximum level of function.         Clinical Decision Makin.  OT Mod:  "Pt evaluation falls under moderate complexity for evaluation coding due to identification of 3-5 performance deficits noted as stated above. Eval required Min/Mod assistance to complete on this date and detailed assessment(s) were utilized. Moreover, an expanded review of history and occupational profile obtained with additional review of cognitive, physical and psychosocial hx."     Plan:     Patient to be seen 4 x/week to address the above listed problems via self-care/home management, therapeutic activities, therapeutic exercises, neuromuscular re-education  · Plan of Care Expires: 18  · Plan of Care Reviewed with: patient    This Plan of care has been discussed with the patient who was involved in its development and understands and is in agreement with the identified goals and treatment plan    GOALS:   Multidisciplinary Problems     Occupational Therapy Goals        Problem: Occupational Therapy Goal    Goal Priority Disciplines Outcome Interventions   Occupational Therapy Goal     OT, PT/OT Ongoing (interventions implemented as appropriate)    Description:  Goals to be met by:      Patient will increase functional independence with ADLs by performing:    Bed mobility with HOB flat and supervision.  LE Dressing with Minimal Assistance and Assistive Devices as needed.  Grooming while seated at sink with Set-up Assistance and Supervision.  Stand pivot transfers with Contact Guard Assistance.  Functional mobility at household distance for ADL task with RW and min(A).                      Time Tracking:     OT Date of Treatment: 08/15/18  OT Start Time: 817  OT Stop Time: 833  OT Total Time (min): 16 min    Billable Minutes:Evaluation 15    SAEED Kirby  8/15/2018    "

## 2018-08-15 NOTE — ASSESSMENT & PLAN NOTE
CAD s/p stent placement 6yrs ago  - On ASA and Plavix at home  - Restarted aspirin 81 mg  - No indication to restart Plavix as stent was placed 6 years ago  - Last saw cardiologist 4 months ago for evaluation  - 2 D echo from 2017 showed normal EF and no significant valve abnormalities.   - No current signs or symptoms of CHF  - Follow up as outpatient

## 2018-08-15 NOTE — PROVIDER TRANSFER
[facility]    FACILITY TRANSFER ORDERS      Patient Name: Edward Barber  YOB: 1955    PCP: Primary Doctor No   PCP Address: None  PCP Phone Number: None  PCP Fax: None    Encounter Date: 08/15/2018    Admit to: [facility]    Vital Signs:  q4h    Diagnoses:   Active Hospital Problems    Diagnosis  POA    *Mass of thoracic vertebra [R22.2]  Yes    Abnormal CT scan, chest [R93.8]  Unknown    Hydroureteronephrosis [N13.30]  Unknown    Hydronephrosis [N13.30]  Yes    Thoracic ascending aortic aneurysm [I71.2]  Yes    Pulmonary emboli [I26.99]  Yes    Coronary artery disease involving native coronary artery of native heart without angina pectoris [I25.10]  Yes    Prostate cancer metastatic to bone [C61, C79.51]  Yes    Thoracic myelopathy [M47.14]  Yes    Bilateral leg weakness [R29.898]  Yes      Resolved Hospital Problems    Diagnosis Date Resolved POA    Pre-operative clearance [Z01.818] 08/13/2018 Not Applicable       Allergies:Review of patient's allergies indicates:  No Known Allergies    Diet: regular diet    Activities: Activity as tolerated with assistance    Nursing: Per facility protocol    Labs: CBC and BMP , INR daily    CONSULTS:   Hospital Medicine. Radiation oncology.  Physical Therapy to evaluate and treat. , Occupational Therapy to evaluate and treat.  and  to evaluate for community resources/long-range planning.    MISCELLANEOUS CARE:  Routine Skin for Bedridden Patients: Apply moisture barrier cream to all skin folds and wet areas in perineal area daily and after baths and all bowel movements.    WOUND CARE ORDERS  None    Medications: Review discharge medications with patient and family and provide education.      Patient is currently being bridged to warfarin with therapeutic lovenox, 90mg SubQ BID for bilateral segmental PEs. He has had 5mg warfarin on 8/13/18 & 8/14/18.  INR today was 1.3.  Please give patient 5mg dose of warfarin  this evening, as he is being transferred before 5pm dose is due.      Current Discharge Medication List      START taking these medications    Details   acetaminophen (TYLENOL) 325 MG tablet Take 2 tablets (650 mg total) by mouth every 6 (six) hours as needed.  Refills: 0      aspirin (ECOTRIN) 81 MG EC tablet Take 1 tablet (81 mg total) by mouth once daily.  Refills: 0      bisacodyl (DULCOLAX) 10 mg Supp Place 1 suppository (10 mg total) rectally daily as needed.  Refills: 0      dexamethasone (DECADRON) 2 MG tablet Take 2 tabs TID x 4d, then 1 tab QID x 4d, then 1 tab TID x4d, then 1 tab BID to be continued  Qty: 20 tablet, Refills: 0      enoxaparin (LOVENOX) 100 mg/mL Syrg Inject 0.9 mLs (90 mg total) into the skin every 12 (twelve) hours.      gabapentin (NEURONTIN) 300 MG capsule Take 1 capsule (300 mg total) by mouth 3 (three) times daily.  Qty: 90 capsule, Refills: 11      oxyCODONE-acetaminophen (PERCOCET)  mg per tablet Take 1 tablet by mouth every 4 (four) hours as needed.  Refills: 0      pantoprazole (PROTONIX) 40 MG tablet Take 1 tablet (40 mg total) by mouth once daily.  Qty: 30 tablet, Refills: 11      polyethylene glycol (GLYCOLAX) 17 gram PwPk Take 17 g by mouth once daily.  Refills: 0      senna-docusate 8.6-50 mg (PERICOLACE) 8.6-50 mg per tablet Take 2 tablets by mouth 2 (two) times daily.      simethicone (MYLICON) 80 MG chewable tablet Take 1 tablet (80 mg total) by mouth 3 (three) times daily as needed for Flatulence.  Refills: 0      warfarin (COUMADIN) 5 MG tablet Take 1 tablet (5 mg total) by mouth once. for 1 dose  Qty: 1 tablet, Refills: 0         CONTINUE these medications which have NOT CHANGED    Details   tamsulosin HCl (FLOMAX ORAL) Take 0.4 mg by mouth nightly.         STOP taking these medications       aspirin 325 MG tablet Comments:   Reason for Stopping:         clopidogrel (PLAVIX) 75 mg tablet Comments:   Reason for Stopping:         enzalutamide (XTANDI) 40 mg Cap  Comments:   Reason for Stopping:         HYDROcodone-acetaminophen (NORCO)  mg per tablet Comments:   Reason for Stopping:         methadone (DOLOPHINE) 10 MG tablet Comments:   Reason for Stopping:                    _________________________________  Angela Villanueva PA-C  08/15/2018

## 2018-08-16 NOTE — PLAN OF CARE
Patient transferred to [facility].  Care deferred to [place].     08/16/18 0916   Final Note   Assessment Type Final Discharge Note   Discharge Disposition (Transferred to [facility] )   Hospital Follow Up  Appt(s) scheduled? No   Discharge plans and expectations educations in teach back method with documentation complete? No

## 2018-08-16 NOTE — PT/OT/SLP DISCHARGE
Occupational Therapy Discharge Summary    Edward Barber  MRN: [id number]   Principal Problem: Mass of thoracic vertebra      Patient Discharged from acute Occupational Therapy on 8/15/2018.  Please refer to prior OT note dated 8/15/2018 for functional status.    Assessment:      Patient has not met goals. goals set same day of eval; same day as d/c.    Objective:     GOALS:   Multidisciplinary Problems     Occupational Therapy Goals     Not on file          Multidisciplinary Problems (Resolved)        Problem: Occupational Therapy Goal    Goal Priority Disciplines Outcome Interventions   Occupational Therapy Goal   (Resolved)     OT, PT/OT Outcome(s) achieved    Description:  Goals to be met by: 8/22     Patient will increase functional independence with ADLs by performing:    Bed mobility with HOB flat and supervision.  LE Dressing with Minimal Assistance and Assistive Devices as needed.  Grooming while seated at sink with Set-up Assistance and Supervision.  Stand pivot transfers with Contact Guard Assistance.  Functional mobility at household distance for ADL task with RW and min(A).                      Reasons for Discontinuation of Therapy Services  Transfer to alternate level of care.      Plan:     Patient Discharged to: [facility]    SAEED Kirby  8/16/2018

## 2018-08-17 NOTE — PROGRESS NOTES
Physical Therapy Discharge Summary    Name: Edward Barber  MRN: [id number]   Principal Problem: Mass of thoracic vertebra     Patient Discharged from acute Physical Therapy on 8/15/18.  Please refer to prior PT noted date on 8/15/18 for functional status.     Assessment:     Patient appropriate for care in another setting.    Objective:     GOALS:   Multidisciplinary Problems     Physical Therapy Goals        Problem: Physical Therapy Goal    Goal Priority Disciplines Outcome Goal Variances Interventions   Physical Therapy Goal     PT, PT/OT Ongoing (interventions implemented as appropriate)     Description:  PT goals until 8/21/18    1. Pt supine to sit with mod independent-not met  2. Pt sit to supine with mod independent-not met  3. Pt sit to stand with RW with SBA-not met  4. Pt to perform gait 100ft with RW with SBA.-not met  5. Pt to transfer bed to/from bedside chair with CGA.-not met  6. Pt to up/down 4 steps with R UE rail with minimal assist.-not met  7. Pt to perform B LE exs in sitting or supine x 20 reps to strengthen B LE to improve functional mobility.-not met                    No goals met due to pt seen for eval only prior to D/C  Reasons for Discontinuation of Therapy Services  Transfer to alternate level of care.      Plan:     Patient Discharged to: transferred to [place].    Suzette Anaya, PT  8/17/2018

## 2018-08-17 NOTE — PLAN OF CARE
Problem: Radiation, External Beam (Adult)  Goal: Signs and Symptoms of Listed Potential Problems Will be Absent, Minimized or Managed (Radiation, External Beam)  Signs and symptoms of listed potential problems will be absent, minimized or managed by discharge/transition of care (reference Radiation, External Beam (Adult) CPG).  Outcome: Unable to achieve outcome(s) by discharge  Radiation to the t-spine to be completed at [place] after discharge from the hospital

## 2020-01-24 ENCOUNTER — TELEPHONE (OUTPATIENT)
Dept: NEUROSURGERY | Facility: CLINIC | Age: 65
End: 2020-01-24

## (undated) DEVICE — ELECTRODE REM PLYHSV RETURN 9

## (undated) DEVICE — SEE MEDLINE ITEM 146417

## (undated) DEVICE — SEE MEDLINE ITEM 157150

## (undated) DEVICE — SUT VICRYL+ 1 CT1 18IN

## (undated) DEVICE — DRESSING AQUACEL FOAM 3 X 3

## (undated) DEVICE — WARMER DRAPE STERILE LF

## (undated) DEVICE — DRAPE INCISE IOBAN 2 23X17IN

## (undated) DEVICE — SEE MEDLINE ITEM 156905

## (undated) DEVICE — DRESSING AQUACEL FOAM 5 X 5

## (undated) DEVICE — DRAPE ABDOMINAL TIBURON 14X11

## (undated) DEVICE — SUT SILK 3-0 BLK BR SH 30IN

## (undated) DEVICE — DIFFUSER

## (undated) DEVICE — DRESSING TRANS 4X4 TEGADERM

## (undated) DEVICE — DRAPE STERI INSTRUMENT 1018

## (undated) DEVICE — SPONGE LAP 4X18 PREWASHED

## (undated) DEVICE — FRAZIER 18FR

## (undated) DEVICE — DRESSING MEPILEX BORDER 4 X 4

## (undated) DEVICE — GAUZE SPONGE 4X4 12PLY

## (undated) DEVICE — DRAPE C ARM 42 X 120 10/BX

## (undated) DEVICE — ELECTRODE BLD 1 INCH TEFLON

## (undated) DEVICE — TRAY FOLEY 16FR INFECTION CONT

## (undated) DEVICE — DRAPE C-ARMOR EQUIPMENT COVER

## (undated) DEVICE — DRESSING AQUACEL FOAM 4 X 12

## (undated) DEVICE — STAPLER SKIN PROXIMATE WIDE

## (undated) DEVICE — TUBE FRAZIER 5MM 2FT SOFT TIP

## (undated) DEVICE — SUT VICRYL PLUS 2-0 CT1 18

## (undated) DEVICE — DRAPE STERI-DRAPE 1000 17X11IN

## (undated) DEVICE — SUT VICRYL PLUS 2-0

## (undated) DEVICE — CLOSURE SKIN 1X5 STERI-STRIP

## (undated) DEVICE — SEE MEDLINE ITEM 146347

## (undated) DEVICE — CARTRIDGE OIL

## (undated) DEVICE — DURAPREP SURG SCRUB 26ML

## (undated) DEVICE — COVER BACK TABLE 72X21

## (undated) DEVICE — SEE MEDLINE ITEM 157131

## (undated) DEVICE — SUT 2-0 SILK 30IN BLK BRAID

## (undated) DEVICE — NDL SPINAL 18GX3.5 SPINOCAN

## (undated) DEVICE — MARKER SKIN STND TIP BLUE BARR

## (undated) DEVICE — KIT SPINAL PATIENT CARE JACK

## (undated) DEVICE — CORD BIPOLAR 12 FOOT

## (undated) DEVICE — SUT MONOCRYL 3-0 PS-2 UND

## (undated) DEVICE — SPONGE GAUZE 16PLY 4X4

## (undated) DEVICE — DRESSING AQUACEL SACRAL 9 X 9